# Patient Record
Sex: MALE | Race: WHITE | NOT HISPANIC OR LATINO | Employment: FULL TIME | URBAN - METROPOLITAN AREA
[De-identification: names, ages, dates, MRNs, and addresses within clinical notes are randomized per-mention and may not be internally consistent; named-entity substitution may affect disease eponyms.]

---

## 2017-02-16 ENCOUNTER — ALLSCRIPTS OFFICE VISIT (OUTPATIENT)
Dept: OTHER | Facility: OTHER | Age: 52
End: 2017-02-16

## 2017-02-16 ENCOUNTER — GENERIC CONVERSION - ENCOUNTER (OUTPATIENT)
Dept: OTHER | Facility: OTHER | Age: 52
End: 2017-02-16

## 2017-02-16 DIAGNOSIS — F41.9 ANXIETY DISORDER: ICD-10-CM

## 2017-02-16 DIAGNOSIS — Z12.5 ENCOUNTER FOR SCREENING FOR MALIGNANT NEOPLASM OF PROSTATE: ICD-10-CM

## 2017-03-01 ENCOUNTER — APPOINTMENT (OUTPATIENT)
Dept: LAB | Facility: MEDICAL CENTER | Age: 52
End: 2017-03-01
Payer: COMMERCIAL

## 2017-03-01 ENCOUNTER — TRANSCRIBE ORDERS (OUTPATIENT)
Dept: ADMINISTRATIVE | Facility: HOSPITAL | Age: 52
End: 2017-03-01

## 2017-03-01 DIAGNOSIS — Z12.5 ENCOUNTER FOR SCREENING FOR MALIGNANT NEOPLASM OF PROSTATE: ICD-10-CM

## 2017-03-01 DIAGNOSIS — F41.9 ANXIETY DISORDER: ICD-10-CM

## 2017-03-01 LAB
ALBUMIN SERPL BCP-MCNC: 3.9 G/DL (ref 3.5–5)
ALP SERPL-CCNC: 51 U/L (ref 46–116)
ALT SERPL W P-5'-P-CCNC: 40 U/L (ref 12–78)
ANION GAP SERPL CALCULATED.3IONS-SCNC: 4 MMOL/L (ref 4–13)
AST SERPL W P-5'-P-CCNC: 22 U/L (ref 5–45)
BASOPHILS # BLD AUTO: 0.03 THOUSANDS/ΜL (ref 0–0.1)
BASOPHILS NFR BLD AUTO: 0 % (ref 0–1)
BILIRUB SERPL-MCNC: 0.89 MG/DL (ref 0.2–1)
BILIRUB UR QL STRIP: NEGATIVE
BUN SERPL-MCNC: 16 MG/DL (ref 5–25)
CALCIUM SERPL-MCNC: 9.2 MG/DL (ref 8.3–10.1)
CHLORIDE SERPL-SCNC: 104 MMOL/L (ref 100–108)
CHOLEST SERPL-MCNC: 204 MG/DL (ref 50–200)
CLARITY UR: NORMAL
CO2 SERPL-SCNC: 30 MMOL/L (ref 21–32)
COLOR UR: YELLOW
CREAT SERPL-MCNC: 0.91 MG/DL (ref 0.6–1.3)
EOSINOPHIL # BLD AUTO: 0.13 THOUSAND/ΜL (ref 0–0.61)
EOSINOPHIL NFR BLD AUTO: 2 % (ref 0–6)
ERYTHROCYTE [DISTWIDTH] IN BLOOD BY AUTOMATED COUNT: 13.3 % (ref 11.6–15.1)
GFR SERPL CREATININE-BSD FRML MDRD: >60 ML/MIN/1.73SQ M
GLUCOSE SERPL-MCNC: 89 MG/DL (ref 65–140)
GLUCOSE UR STRIP-MCNC: NEGATIVE MG/DL
HCT VFR BLD AUTO: 43.8 % (ref 36.5–49.3)
HDLC SERPL-MCNC: 58 MG/DL (ref 40–60)
HGB BLD-MCNC: 15.2 G/DL (ref 12–17)
HGB UR QL STRIP.AUTO: NEGATIVE
KETONES UR STRIP-MCNC: NEGATIVE MG/DL
LDLC SERPL CALC-MCNC: 109 MG/DL (ref 0–100)
LEUKOCYTE ESTERASE UR QL STRIP: NEGATIVE
LYMPHOCYTES # BLD AUTO: 1.33 THOUSANDS/ΜL (ref 0.6–4.47)
LYMPHOCYTES NFR BLD AUTO: 19 % (ref 14–44)
MCH RBC QN AUTO: 30.5 PG (ref 26.8–34.3)
MCHC RBC AUTO-ENTMCNC: 34.7 G/DL (ref 31.4–37.4)
MCV RBC AUTO: 88 FL (ref 82–98)
MONOCYTES # BLD AUTO: 0.99 THOUSAND/ΜL (ref 0.17–1.22)
MONOCYTES NFR BLD AUTO: 14 % (ref 4–12)
NEUTROPHILS # BLD AUTO: 4.43 THOUSANDS/ΜL (ref 1.85–7.62)
NEUTS SEG NFR BLD AUTO: 65 % (ref 43–75)
NITRITE UR QL STRIP: NEGATIVE
NRBC BLD AUTO-RTO: 0 /100 WBCS
PH UR STRIP.AUTO: 6.5 [PH] (ref 4.5–8)
PLATELET # BLD AUTO: 246 THOUSANDS/UL (ref 149–390)
PMV BLD AUTO: 10.2 FL (ref 8.9–12.7)
POTASSIUM SERPL-SCNC: 5 MMOL/L (ref 3.5–5.3)
PROT SERPL-MCNC: 7.3 G/DL (ref 6.4–8.2)
PROT UR STRIP-MCNC: NEGATIVE MG/DL
PSA SERPL-MCNC: 1.5 NG/ML (ref 0–4)
RBC # BLD AUTO: 4.99 MILLION/UL (ref 3.88–5.62)
SODIUM SERPL-SCNC: 138 MMOL/L (ref 136–145)
SP GR UR STRIP.AUTO: 1.02 (ref 1–1.03)
TRIGL SERPL-MCNC: 183 MG/DL
UROBILINOGEN UR QL STRIP.AUTO: 0.2 E.U./DL
WBC # BLD AUTO: 6.95 THOUSAND/UL (ref 4.31–10.16)

## 2017-03-01 PROCEDURE — 36415 COLL VENOUS BLD VENIPUNCTURE: CPT

## 2017-03-01 PROCEDURE — 80061 LIPID PANEL: CPT

## 2017-03-01 PROCEDURE — 80053 COMPREHEN METABOLIC PANEL: CPT

## 2017-03-01 PROCEDURE — 81003 URINALYSIS AUTO W/O SCOPE: CPT

## 2017-03-01 PROCEDURE — G0103 PSA SCREENING: HCPCS

## 2017-03-01 PROCEDURE — 85025 COMPLETE CBC W/AUTO DIFF WBC: CPT

## 2017-10-18 ENCOUNTER — GENERIC CONVERSION - ENCOUNTER (OUTPATIENT)
Dept: OTHER | Facility: OTHER | Age: 52
End: 2017-10-18

## 2018-01-12 VITALS
BODY MASS INDEX: 29.04 KG/M2 | OXYGEN SATURATION: 95 % | DIASTOLIC BLOOD PRESSURE: 76 MMHG | HEART RATE: 74 BPM | HEIGHT: 73 IN | WEIGHT: 219.13 LBS | SYSTOLIC BLOOD PRESSURE: 128 MMHG

## 2018-06-10 ENCOUNTER — ANESTHESIA EVENT (OUTPATIENT)
Dept: GASTROENTEROLOGY | Facility: HOSPITAL | Age: 53
End: 2018-06-10
Payer: COMMERCIAL

## 2018-06-11 ENCOUNTER — ANESTHESIA (OUTPATIENT)
Dept: GASTROENTEROLOGY | Facility: HOSPITAL | Age: 53
End: 2018-06-11
Payer: COMMERCIAL

## 2018-06-11 ENCOUNTER — HOSPITAL ENCOUNTER (OUTPATIENT)
Facility: HOSPITAL | Age: 53
Setting detail: OUTPATIENT SURGERY
Discharge: HOME | End: 2018-06-11
Attending: COLON & RECTAL SURGERY | Admitting: COLON & RECTAL SURGERY
Payer: COMMERCIAL

## 2018-06-11 VITALS
HEIGHT: 73 IN | DIASTOLIC BLOOD PRESSURE: 60 MMHG | TEMPERATURE: 98.3 F | SYSTOLIC BLOOD PRESSURE: 113 MMHG | WEIGHT: 205 LBS | RESPIRATION RATE: 16 BRPM | BODY MASS INDEX: 27.17 KG/M2 | OXYGEN SATURATION: 99 % | HEART RATE: 63 BPM

## 2018-06-11 RX ORDER — PROPOFOL 10 MG/ML
INJECTION, EMULSION INTRAVENOUS AS NEEDED
Status: DISCONTINUED | OUTPATIENT
Start: 2018-06-11 | End: 2018-06-11 | Stop reason: SURG

## 2018-06-11 RX ORDER — SODIUM CHLORIDE 9 MG/ML
125 INJECTION, SOLUTION INTRAVENOUS CONTINUOUS
Status: DISCONTINUED | OUTPATIENT
Start: 2018-06-11 | End: 2018-06-11 | Stop reason: HOSPADM

## 2018-06-11 RX ADMIN — LIDOCAINE HYDROCHLORIDE 60 MG: 20 INJECTION, SOLUTION INTRAVENOUS at 09:00

## 2018-06-11 RX ADMIN — PROPOFOL 100 MG: 10 INJECTION, EMULSION INTRAVENOUS at 09:00

## 2018-06-11 RX ADMIN — PROPOFOL 50 MG: 10 INJECTION, EMULSION INTRAVENOUS at 09:10

## 2018-06-11 RX ADMIN — PROPOFOL 25 MG: 10 INJECTION, EMULSION INTRAVENOUS at 09:13

## 2018-06-11 RX ADMIN — PROPOFOL 50 MG: 10 INJECTION, EMULSION INTRAVENOUS at 09:03

## 2018-06-11 RX ADMIN — SODIUM CHLORIDE 125 ML/HR: 0.9 INJECTION, SOLUTION INTRAVENOUS at 08:35

## 2018-06-11 RX ADMIN — PROPOFOL 50 MG: 10 INJECTION, EMULSION INTRAVENOUS at 09:06

## 2018-06-11 NOTE — H&P
[facility]CTAL [facility]  HISTORY AND PHYSICAL EXAM  Rachael Aguilera III II 46 y o  male MRN: [id number]  Unit/Bed#: [facility] [place] Encounter: [id number]    Assessment/Plan     Indication for colonoscopy: Previous history of adenomatous polyps    Plan:  Flexible Colonoscopy    Review of Systems    Historical Information   Past Medical History:   Diagnosis Date    Anxiety 10/16/2012    History of colon polyps      Past Surgical History:   Procedure Laterality Date    COLONOSCOPY  2015    HAND SURGERY      POLYPECTOMY       Social History   History   Alcohol Use    Yes     Comment: socially     History   Drug Use No     History   Smoking Status    Never Smoker   Smokeless Tobacco    Never Used     Family History: non-contributory    Meds/Allergies   all medications and allergies reviewed  Allergies   Allergen Reactions    Penicillins Hives    Procaine GI Intolerance       Objective   First Vitals:   Blood Pressure: 125/78 (06/11/18 0816)  Pulse: 66 (06/11/18 0816)  Temperature: 98 3 °F (36 8 °C) (06/11/18 0816)  Temp Source: Temporal (06/11/18 0816)  Respirations: 18 (06/11/18 0816)  Height: 6' 1" (185 4 cm) (06/11/18 0816)  Weight - Scale: 93 kg (205 lb) (06/11/18 0816)  SpO2: 96 % (06/11/18 0816)    Current Vitals:   Blood Pressure: 125/78 (06/11/18 0816)  Pulse: 66 (06/11/18 0816)  Temperature: 98 3 °F (36 8 °C) (06/11/18 0816)  Temp Source: Temporal (06/11/18 0816)  Respirations: 18 (06/11/18 0816)  Height: 6' 1" (185 4 cm) (06/11/18 0816)  Weight - Scale: 93 kg (205 lb) (06/11/18 0816)  SpO2: 96 % (06/11/18 0816)    No intake or output data in the 24 hours ending 06/11/18 0900    Invasive Devices     Peripheral Intravenous Line            Peripheral IV 06/11/18 Right Arm less than 1 day                Physical Exam    HEENT: Normocephalic, atraumatic  Lungs: Clear  Heart: Regular rate and rhythm  Abdomen: Soft  Nondistended   Nontender  Extremities: No edema    Lab Results: I have personally reviewed pertinent lab results  EKG, Pathology, and Other Studies: I have personally reviewed pertinent reports

## 2018-06-11 NOTE — OP NOTE
OPERATIVE REPORT  PATIENT NAME: Charmaine Herrera III II    :  1965  MRN: [id number]  Pt Location: [facility]    SURGERY DATE: 2018    Indications:  Previous history of colon polyps  Procedure:  Colonoscopy to cecum  Findings:  Normal colonoscopy  Anesthesia Type: IV Sedation with Anesthesia    Complications: None      Procedure: The patient was in the left lateral position  Sedation was administered by anesthesia  Rectal exam was unremarkable  The scope was introduced and passed without difficulty to the cecum  The scope was removed  The bowel preparation was good  There were no mucosal lesions seen  Impression:  Normal colonoscopy  Personal history of colon polyps  Plan:  Repeat colonoscopy 5 years  Specimen(s):  * No specimens in log *      Attestation: I was present for the entire procedure        Patient Disposition: PACU      SIGNATURE: Gabino El MD  DATE: 2018  TIME: 9:18 AM

## 2018-06-11 NOTE — ANESTHESIA POSTPROCEDURE EVALUATION
Post-Op Assessment Note      CV Status:  Stable    Mental Status:  Alert and awake    Hydration Status:  Euvolemic    PONV Controlled:  Controlled    Airway Patency:  Patent    Post Op Vitals Reviewed: Yes          Staff: AnesthesiologistFIFI           /60 (06/11/18 0935)    Temp      Pulse 63 (06/11/18 0935)   Resp 16 (06/11/18 0935)    SpO2 99 % (06/11/18 0935)

## 2018-06-11 NOTE — ANESTHESIA PREPROCEDURE EVALUATION
Review of Systems/Medical History  Patient summary reviewed  Chart reviewed  No history of anesthetic complications     Cardiovascular   Pulmonary  Negative pulmonary ROS        GI/Hepatic    Bowel prep  Comment: hcp          Endo/Other  Negative endo/other ROS      GYN       Hematology  Negative hematology ROS      Musculoskeletal  Negative musculoskeletal ROS        Neurology  Negative neurology ROS      Psychology   Anxiety,              Physical Exam    Airway    Mallampati score: II  TM Distance: >3 FB  Neck ROM: full     Dental   No notable dental hx     Cardiovascular  Rhythm: regular, Rate: normal, Cardiovascular exam normal    Pulmonary  Pulmonary exam normal Breath sounds clear to auscultation,     Other Findings        Anesthesia Plan  ASA Score- 2     Anesthesia Type- IV sedation with anesthesia with ASA Monitors  Additional Monitors:   Airway Plan:         Plan Factors- Patient instructed to abstain from smoking on day of procedure  Patient smoked on day of surgery  Induction- intravenous  Postoperative Plan-     Informed Consent- Anesthetic plan and risks discussed with patient and spouse  I personally reviewed this patient with the CRNA  Discussed and agreed on the Anesthesia Plan with the CRNA  Jeanne Magallanes

## 2018-06-11 NOTE — DISCHARGE INSTRUCTIONS
[facility]  OF [facility] [address] [facility], [address] [place] [address] [place]  Phone: [phone]    DISCHARGE INSTRUCTIONS:    1   __x_ Complete Exam - Normal    2   ___ Exam normal, but entire colon not seen  We will discuss this with you  3   ___ Polyp(s) removed by "burning" - NO pathology report will follow    4   ___ Polyp(s) removed by excision  Pathology report will be available in 4-5 days   Someone from our office will call you with results  5   ___ Exam prompted biopsies  Pathology report will be available in 4-5 days   Someone from our office will call you with results  6   ___ Exam demonstrated findings that need treatment  Prescriptions will be   Given to you  Return to our office in ____ weeks  Please call for appt  7   ___ Original office visit or colonoscopy findings necessitate an office visit  Please call to set up a new appointment    8   ___ Medication  __________________________________________        [address]:    - Go straight home and rest today    - No driving or drinking alcohol for 24 hours    - Resume regular diet and medications unless otherwise instructed  Coumadin and Plavix are blood thinners  You can resume these medications on __________      IF YOU ARE HAVING ANY FEVER, BLEEDING OR PERSISTENT PAIN IN THE ABDOMEN, PLEASE CALL OUR OFFICE ANY DAY OR TIME  [phone]      Ariadna Fernandes MD Physician Signed Colorectal  Op Note Date of Service: 2018  9:01 AM   Case ID: [id number] Case Time: 2018  9:01 AM Surgeon: Ariadna Fernandse MD   Procedure: COLONOSCOPY Location: [facility]          []Hide copied text  []Hover for attribution information  OPERATIVE REPORT  PATIENT NAME: Jennifer Farley    :  1965  MRN: [id number]  Pt Location: [facility]     SURGERY DATE: 2018     Indications:  Previous history of colon polyps      Procedure:  Colonoscopy to cecum      Findings:  Normal colonoscopy      Anesthesia Type: IV Sedation with Anesthesia     Complications: None        Procedure: The patient was in the left lateral position  Sedation was administered by anesthesia  Rectal exam was unremarkable  The scope was introduced and passed without difficulty to the cecum  The scope was removed  The bowel preparation was good  There were no mucosal lesions seen         Impression:  Normal colonoscopy  Personal history of colon polyps      Plan:  Repeat colonoscopy 5 years         Specimen(s):  * No specimens in log *        Attestation: I was present for the entire procedure         Patient Disposition: PACU        SIGNATURE: Kaden Zamorano MD  DATE: June 11, 2018  TIME: 9:18 AM            Routing History              Colonoscopy   WHAT YOU NEED TO KNOW:   A colonoscopy is a procedure to examine the inside of your colon (intestine) with a scope  Polyps or tissue growths may have been removed during your colonoscopy  It is normal to feel bloated and to have some abdominal discomfort  You should be passing gas  If you have hemorrhoids or you had polyps removed, you may have a small amount of bleeding  DISCHARGE INSTRUCTIONS:   Seek care immediately if:   · You have a large amount of bright red blood in your bowel movements  · Your abdomen is hard and firm and you have severe pain  · You have sudden trouble breathing  Contact your healthcare provider if:   · You develop a rash or hives  · You have a fever within 24 hours of your procedure  · You have not had a bowel movement for 3 days after your procedure  · You have questions or concerns about your condition or care  Activity:   · Do not lift, strain, or run  for 3 days after your procedure  · Rest after your procedure  You have been given medicine to relax you  Do not  drive or make important decisions until the day after your procedure  Return to your normal activity as directed      · Relieve gas and discomfort from bloating  by lying on your right side with a heating pad on your abdomen  You may need to take short walks to help the gas move out  Eat small meals until bloating is relieved  If you had polyps removed: For 7 days after your procedure:  · Do not  take aspirin  · Do not  go on long car rides  Help prevent constipation:   · Eat a variety of healthy foods  Healthy foods include fruit, vegetables, whole-grain breads, low-fat dairy products, beans, lean meat, and fish  Ask if you need to be on a special diet  Your healthcare provider may recommend that you eat high-fiber foods such as cooked beans  Fiber helps you have regular bowel movements  · Drink liquids as directed  Adults should drink between 9 and 13 eight-ounce cups of liquid every day  Ask what amount is best for you  For most people, good liquids to drink are water, juice, and milk  · Exercise as directed  Talk to your healthcare provider about the best exercise plan for you  Exercise can help prevent constipation, decrease your blood pressure and improve your health  Follow up with your healthcare provider as directed:  Write down your questions so you remember to ask them during your visits  © 2017 [employer]  Information is for End User's use only and may not be sold, redistributed or otherwise used for commercial purposes  All illustrations and images included in CareNotes® are the copyrighted property of [employer] , [employer]  or [employer]  The above information is an  only  It is not intended as medical advice for individual conditions or treatments  Talk to your doctor, nurse or pharmacist before following any medical regimen to see if it is safe and effective for you

## 2018-12-17 ENCOUNTER — OFFICE VISIT (OUTPATIENT)
Dept: INTERNAL MEDICINE CLINIC | Facility: CLINIC | Age: 53
End: 2018-12-17
Payer: COMMERCIAL

## 2018-12-17 VITALS
WEIGHT: 223.6 LBS | TEMPERATURE: 97.4 F | SYSTOLIC BLOOD PRESSURE: 123 MMHG | HEART RATE: 82 BPM | BODY MASS INDEX: 29.63 KG/M2 | DIASTOLIC BLOOD PRESSURE: 71 MMHG | HEIGHT: 73 IN | OXYGEN SATURATION: 97 %

## 2018-12-17 DIAGNOSIS — Z00.00 WELLNESS EXAMINATION: Primary | ICD-10-CM

## 2018-12-17 DIAGNOSIS — Z12.5 SCREENING PSA (PROSTATE SPECIFIC ANTIGEN): ICD-10-CM

## 2018-12-17 PROCEDURE — 99396 PREV VISIT EST AGE 40-64: CPT | Performed by: INTERNAL MEDICINE

## 2018-12-17 NOTE — PROGRESS NOTES
Assessment/Plan   Problem List Items Addressed This Visit     None      Visit Diagnoses     Wellness examination    -  Primary    Relevant Orders    Comprehensive metabolic panel    CBC and differential    Lipid panel    UA w Reflex to Microscopic w Reflex to Culture    Screening PSA (prostate specific antigen)        Relevant Orders    PSA, Total Screen      Sara Eubanks is doing well overall and will have his annual lab work in the near future  He was encouraged to follow through on his plans to start running regularly again after the holidays  We discussed follow-up visits which should be annually  He was encouraged to call between visits for any questions or problems  Subjective   Patient ID: Mark Mauricio is a 46 y o  male, here for his annual preventive exam and was last seen February 16, 2017 and has been doing well since that time  Vitals:    12/17/18 0756   BP: 123/71   Pulse: 82   Temp: (!) 97 4 °F (36 3 °C)   SpO2: 97%     HPI   He and his family are doing well and his daughter lives in the area after being transferred out of the area  He exercises regularly overall but not this fall as he has been busy with work  He enjoys running and ran a half marathon in spring of this year and did very well  He has excellent exercise tolerance with no chest pain or shortness of breath and no history of heart disease  Other than an episode of pink eye recently he has not had receive medical care  He went to a local pharmacy and saw a PA and pink eye resolved  He is up-to-date on eye care with recent exam and stable vision, up-to-date on dental care  He sees Dermatology every other year with no history of skin cancer, family history unknown as he is adopted  He did have a skin exam today which was benign  He does use sunscreen  He has some dry skin and I suggested using a cold water humidifiers room at night as this only happens in the winter with low humidity is      At age 27 he was diagnosed as having it dysplastic colon polyp, and has had regular screening since that time  His last colonoscopy was April 13, 2015, and this result was negative and he is on a 5 year recall list and has a negative GI review of systems  He did have a prostate exam today was benign and has no prostate symptoms  He declines a flu shot  We discussed follow-up which should be in 1 year for general preventive examination and sooner as needed  The following portions of the patient's history were reviewed and updated as appropriate: allergies, current medications, past family history, past medical history, past social history, past surgical history and problem list     Review of Systems as above, all others negative  Objective   Physical Exam   Vital signs stable with regular pulse  In no acute distress  HEENT exam is normal other than for male pattern baldness  Skin throughout shows no rash or skin malignancy, and some nonspecific dried is of the skin of the back, benign hemangiomas and nevi  Vision hearing grossly normal   Affect normal cognitive status is normal   There is no head or neck adenopathy or supraclavicular adenopathy  Neck supple without JVD, thyroid exam normal on inspection and palpation  Carotid upstroke and descent are normal without bruit  The lungs are clear throughout  Cardiac:  Regular rate rhythm, normal S1 and S2, no murmur, no S4 or S3, PMI fifth intercostal space midclavicular line  Abdomen:  No hernia, no distension, no periumbilical adenopathy or surgical scars, normal bowel sounds, soft and nontender without masses bruits organomegaly present  Rectal exam:  Small external hemorrhoids, normal sphincter tone without masses or tenderness of the rectal vault, brown heme-negative stool, prostate 0 to 1 in size without mass or tenderness  Extremities show no clubbing cyanosis or edema  Peripheral circulation is symmetric normal   No phlebitic findings or calf tenderness are noted  There is no significant joint deformity or any loss of joint function  Attending Attestation (For Attendings USE Only)...

## 2021-06-25 ENCOUNTER — TELEPHONE (OUTPATIENT)
Dept: INTERNAL MEDICINE CLINIC | Facility: CLINIC | Age: 56
End: 2021-06-25

## 2021-06-25 ENCOUNTER — RA CDI HCC (OUTPATIENT)
Dept: OTHER | Facility: HOSPITAL | Age: 56
End: 2021-06-25

## 2021-06-25 DIAGNOSIS — Z13.1 SCREENING FOR DIABETES MELLITUS: ICD-10-CM

## 2021-06-25 DIAGNOSIS — Z00.00 WELL ADULT EXAM: Primary | ICD-10-CM

## 2021-06-25 DIAGNOSIS — Z13.0 SCREENING FOR DEFICIENCY ANEMIA: ICD-10-CM

## 2021-06-25 DIAGNOSIS — Z12.5 SCREENING FOR PROSTATE CANCER: ICD-10-CM

## 2021-06-25 DIAGNOSIS — Z13.220 SCREENING FOR HYPERLIPIDEMIA: ICD-10-CM

## 2021-06-25 NOTE — PROGRESS NOTES
[facility]          Chart reviewed, no opportunity found: CHART REVIEWED, NO OPPORTUNITY FOUND                     Patients insurance company:  [employer] [facility] (Medicare Advantage and Commercial)

## 2021-06-25 NOTE — TELEPHONE ENCOUNTER
Patient scheduled an appointment next Friday and asked if he could have blood work prior to his appt  When/if ordered will call patient to notify

## 2021-06-29 ENCOUNTER — APPOINTMENT (OUTPATIENT)
Dept: LAB | Facility: MEDICAL CENTER | Age: 56
End: 2021-06-29
Payer: COMMERCIAL

## 2021-06-29 DIAGNOSIS — Z00.00 WELL ADULT EXAM: ICD-10-CM

## 2021-06-29 DIAGNOSIS — Z13.220 SCREENING FOR HYPERLIPIDEMIA: ICD-10-CM

## 2021-06-29 LAB
ALBUMIN SERPL BCP-MCNC: 3.7 G/DL (ref 3.5–5)
ALP SERPL-CCNC: 53 U/L (ref 46–116)
ALT SERPL W P-5'-P-CCNC: 38 U/L (ref 12–78)
ANION GAP SERPL CALCULATED.3IONS-SCNC: 4 MMOL/L (ref 4–13)
AST SERPL W P-5'-P-CCNC: 25 U/L (ref 5–45)
BACTERIA UR QL AUTO: NORMAL /HPF
BASOPHILS # BLD AUTO: 0.05 THOUSANDS/ΜL (ref 0–0.1)
BASOPHILS NFR BLD AUTO: 1 % (ref 0–1)
BILIRUB SERPL-MCNC: 0.74 MG/DL (ref 0.2–1)
BILIRUB UR QL STRIP: NEGATIVE
BUN SERPL-MCNC: 13 MG/DL (ref 5–25)
CALCIUM SERPL-MCNC: 9 MG/DL (ref 8.3–10.1)
CHLORIDE SERPL-SCNC: 106 MMOL/L (ref 100–108)
CHOLEST SERPL-MCNC: 195 MG/DL (ref 50–200)
CLARITY UR: CLEAR
CO2 SERPL-SCNC: 27 MMOL/L (ref 21–32)
COLOR UR: YELLOW
CREAT SERPL-MCNC: 0.9 MG/DL (ref 0.6–1.3)
EOSINOPHIL # BLD AUTO: 0.14 THOUSAND/ΜL (ref 0–0.61)
EOSINOPHIL NFR BLD AUTO: 3 % (ref 0–6)
ERYTHROCYTE [DISTWIDTH] IN BLOOD BY AUTOMATED COUNT: 12.9 % (ref 11.6–15.1)
GFR SERPL CREATININE-BSD FRML MDRD: 94 ML/MIN/1.73SQ M
GLUCOSE P FAST SERPL-MCNC: 89 MG/DL (ref 65–99)
GLUCOSE UR STRIP-MCNC: NEGATIVE MG/DL
HCT VFR BLD AUTO: 42.3 % (ref 36.5–49.3)
HDLC SERPL-MCNC: 48 MG/DL
HGB BLD-MCNC: 14.1 G/DL (ref 12–17)
HGB UR QL STRIP.AUTO: NEGATIVE
HYALINE CASTS #/AREA URNS LPF: NORMAL /LPF
IMM GRANULOCYTES # BLD AUTO: 0.03 THOUSAND/UL (ref 0–0.2)
IMM GRANULOCYTES NFR BLD AUTO: 1 % (ref 0–2)
KETONES UR STRIP-MCNC: NEGATIVE MG/DL
LDLC SERPL CALC-MCNC: 125 MG/DL (ref 0–100)
LEUKOCYTE ESTERASE UR QL STRIP: NEGATIVE
LYMPHOCYTES # BLD AUTO: 1.21 THOUSANDS/ΜL (ref 0.6–4.47)
LYMPHOCYTES NFR BLD AUTO: 23 % (ref 14–44)
MCH RBC QN AUTO: 29.7 PG (ref 26.8–34.3)
MCHC RBC AUTO-ENTMCNC: 33.3 G/DL (ref 31.4–37.4)
MCV RBC AUTO: 89 FL (ref 82–98)
MONOCYTES # BLD AUTO: 0.67 THOUSAND/ΜL (ref 0.17–1.22)
MONOCYTES NFR BLD AUTO: 13 % (ref 4–12)
NEUTROPHILS # BLD AUTO: 3.18 THOUSANDS/ΜL (ref 1.85–7.62)
NEUTS SEG NFR BLD AUTO: 59 % (ref 43–75)
NITRITE UR QL STRIP: NEGATIVE
NON-SQ EPI CELLS URNS QL MICRO: NORMAL /HPF
NONHDLC SERPL-MCNC: 147 MG/DL
NRBC BLD AUTO-RTO: 0 /100 WBCS
PH UR STRIP.AUTO: 7 [PH]
PLATELET # BLD AUTO: 257 THOUSANDS/UL (ref 149–390)
PMV BLD AUTO: 9.7 FL (ref 8.9–12.7)
POTASSIUM SERPL-SCNC: 3.7 MMOL/L (ref 3.5–5.3)
PROT SERPL-MCNC: 7.1 G/DL (ref 6.4–8.2)
PROT UR STRIP-MCNC: NEGATIVE MG/DL
PSA SERPL-MCNC: 1.2 NG/ML (ref 0–4)
RBC # BLD AUTO: 4.74 MILLION/UL (ref 3.88–5.62)
RBC #/AREA URNS AUTO: NORMAL /HPF
SODIUM SERPL-SCNC: 137 MMOL/L (ref 136–145)
SP GR UR STRIP.AUTO: 1 (ref 1–1.03)
TRIGL SERPL-MCNC: 110 MG/DL
UROBILINOGEN UR QL STRIP.AUTO: 0.2 E.U./DL
WBC # BLD AUTO: 5.28 THOUSAND/UL (ref 4.31–10.16)
WBC #/AREA URNS AUTO: NORMAL /HPF

## 2021-06-29 PROCEDURE — G0103 PSA SCREENING: HCPCS | Performed by: INTERNAL MEDICINE

## 2021-06-29 PROCEDURE — 85025 COMPLETE CBC W/AUTO DIFF WBC: CPT | Performed by: INTERNAL MEDICINE

## 2021-06-29 PROCEDURE — 81001 URINALYSIS AUTO W/SCOPE: CPT | Performed by: INTERNAL MEDICINE

## 2021-06-29 PROCEDURE — 80061 LIPID PANEL: CPT

## 2021-06-29 PROCEDURE — 80053 COMPREHEN METABOLIC PANEL: CPT | Performed by: INTERNAL MEDICINE

## 2021-06-29 PROCEDURE — 36415 COLL VENOUS BLD VENIPUNCTURE: CPT | Performed by: INTERNAL MEDICINE

## 2021-07-02 ENCOUNTER — OFFICE VISIT (OUTPATIENT)
Dept: INTERNAL MEDICINE CLINIC | Facility: CLINIC | Age: 56
End: 2021-07-02
Payer: COMMERCIAL

## 2021-07-02 VITALS
HEIGHT: 73 IN | HEART RATE: 81 BPM | SYSTOLIC BLOOD PRESSURE: 126 MMHG | DIASTOLIC BLOOD PRESSURE: 78 MMHG | BODY MASS INDEX: 28.49 KG/M2 | TEMPERATURE: 97.2 F | WEIGHT: 215 LBS | OXYGEN SATURATION: 98 %

## 2021-07-02 DIAGNOSIS — Z86.010 HISTORY OF COLON POLYPS: ICD-10-CM

## 2021-07-02 DIAGNOSIS — Z00.00 WELL ADULT EXAM: Primary | ICD-10-CM

## 2021-07-02 PROBLEM — M17.31 POST-TRAUMATIC OSTEOARTHRITIS OF RIGHT KNEE: Status: ACTIVE | Noted: 2021-07-02

## 2021-07-02 PROBLEM — Z86.0100 HISTORY OF COLON POLYPS: Status: ACTIVE | Noted: 2021-07-02

## 2021-07-02 PROCEDURE — 99396 PREV VISIT EST AGE 40-64: CPT | Performed by: INTERNAL MEDICINE

## 2021-07-02 RX ORDER — ZINC GLUCONATE 50 MG
50 TABLET ORAL DAILY
COMMUNITY
End: 2022-04-22

## 2021-07-02 NOTE — PROGRESS NOTES
Assessment/Plan:    1  Well adult exam     2  History of colon polyps  Ambulatory referral for colonoscopy        A&P Notes:    Plan is follow up in 1 year and was encouraged sooner as needed  We discussed lifestyle  We discussed walking and other low-impact exercise verses running to avoid progression of symptoms of osteoarthritis  Subjective:      Patient ID: Lauren Mueller is a 61 y o  male who is here for an annual preventive examination  I last saw Scott Oh in 2018  He has done well during the COVID interval   He is vaccinated and did not have side effects from the Karli Soliman vaccine  He is working very long hours, the 13-14 hours a day as a  in [employer]  He has not been able to take a vacation  His wife are talking about setting a date for him to retire in the next few years  He seems to be coping with this well  This includes no symptoms of depression or sleep disturbance  He was running a year ago and had acute pain in his right knee and since that time has had pain with running, chronic swelling, some stiffness, no instability or locking  He can walk without symptoms  We discussed 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  We discussed conservative plan  This includes taking Tylenol as needed, and possibly Motrin but to minimize analgesics  He generally likes to avoid analgesics  Applying ice is symptomatic was also recommended  He will monitor his symptoms and follow-up as needed  We discussed preventive care  I did recommend that he see Dermatology once a year  Family history is unknown including regarding skin cancer  He is going to make an appointment through his wife's dermatology group  He has a history of a large benign polyp at age 27, removed at that time, and is on a defined follow-up colonoscopy schedule  His last colonoscopy was in 2015 1 polyp was found  He was due last year    He is asymptomatic from a GI standpoint  He will be contacting his private colorectal group, Dr Edgar Weinberg, to arrange for colonoscopy  Eye care and dental care up-to-date  We reviewed his lab work which is optimal   This is despite suboptimal diet and intermittent exercise  Past Medical History:   Diagnosis Date    Anxiety 10/16/2012    History of colon polyps         History reviewed  No pertinent family history  Social History     Socioeconomic History    Marital status: /Civil Union     Spouse name: Not on file    Number of children: Not on file    Years of education: Not on file    Highest education level: Not on file   Occupational History    Not on file   Tobacco Use    Smoking status: Never Smoker    Smokeless tobacco: Never Used   Substance and Sexual Activity    Alcohol use: Yes     Comment: socially    Drug use: No    Sexual activity: Not on file   Other Topics Concern    Not on file   Social History Narrative    Not on file     Social Determinants of Health     Financial Resource Strain:     Difficulty of Paying Living Expenses:    Food Insecurity:     Worried About Running Out of Food in the Last Year:     [address] in the Last Year:    Transportation Needs:     Lack of Transportation (Medical):  Lack of Transportation (Non-Medical):    Physical Activity:     Days of Exercise per Week:     Minutes of Exercise per Session:    Stress:     Feeling of Stress :    Social Connections:     Frequency of Communication with Friends and Family:     Frequency of Social Gatherings with Friends and Family:     Attends Jew Services:     Active Member of Clubs or Organizations:     Attends Club or Organization Meetings:     Marital Status:    Intimate Partner Violence:     Fear of Current or Ex-Partner:     Emotionally Abused:     Physically Abused:     Sexually Abused:          Allergies   Allergen Reactions    Penicillins Hives    Procaine GI Intolerance        Current Outpatient Medications   Medication Sig Dispense Refill    Multiple Vitamins-Minerals (Centrum Silver 50+Men) TABS Take by mouth      Omega-3 Fatty Acids (FISH OIL PO) Take by mouth      VITAMIN D PO Take by mouth      zinc gluconate 50 mg tablet Take 50 mg by mouth daily       No current facility-administered medications for this visit  Review of Systems  no hearing impairment, no change in vision  Occasional mild allergic upper respiratory congestion  No chest pain or shortness of breath  No change in bowel habits, no indigestion  No difficulties with urination  Also see above  Objective:      /78   Pulse 81   Temp (!) 97 2 °F (36 2 °C)   Ht 6' 1" (1 854 m)   Wt 97 5 kg (215 lb)   SpO2 98%   BMI 28 37 kg/m²      Wt Readings from Last 3 Encounters:   07/02/21 97 5 kg (215 lb)   12/17/18 101 kg (223 lb 9 6 oz)   06/11/18 93 kg (205 lb)     Temp Readings from Last 3 Encounters:   07/02/21 (!) 97 2 °F (36 2 °C)   12/17/18 (!) 97 4 °F (36 3 °C) (Tympanic)   06/11/18 98 3 °F (36 8 °C) (Temporal)     BP Readings from Last 3 Encounters:   07/02/21 126/78   12/17/18 123/71   06/11/18 113/60     Pulse Readings from Last 3 Encounters:   07/02/21 81   12/17/18 82   06/11/18 63       VSS, afebrile, pulse regular    Alert and Oriented in NAD    HEENT: NCAT, Pupils equal, 3 mm, EOEMI, no icterus or pallor, no iritis or conjunctivitis  No head or neck mass or adenopathy  Fundi appeared benign including no opacities, normal vasculature no hemorrhages or exudates  ENT:    Ears:  normal-appearing external auditory canals and tympanic membranes,     Nose: patent nasal passages without polyps or masses, no septal deviation, no nasal deformity,     Oral cavity and throat: normal dentition, no gum disease, normal mucosa, patent airway, no hemorrhages or exudates in the throat    Exam of salivary glands and ducts are normal  No submandibular or submental adenopathy    Neck: supple, no trauma or tenderness  No JVD  Normal carotid upstroke and descent without bruits  Trachea midline without stridor  Thyroid exam normal on inspection and palpation  No spinal tenderness, full range of motion  Lymphatics: no adenopathy throughout    Chest:  No trauma or deformity, no supraclavicular adenopathy or bruit  Chest wall expansion is symmetric and normal, expiratory phase is not prolonged  Heart:  Regular rate and rhythm, normal P9-H0, no V9-C7, no clicks murmurs or rubs  Lungs:  Clear to auscultation and normal to percussion  Back:  No trauma or deformity, no CVA mass or CVA tenderness  Skin:  No rash or skin malignancy  Abdomen:  Nondistended, normal bowel sounds, soft nontender without masses bruits organomegaly  There is no hernia  There are no surgical scars  Extremities:  Arterial circulation is symmetrically normal with no clubbing cyanosis or edema  There are no varicosities, there is no calf tenderness or phlebitic findings  Joints:  Right knee: Mild effusion, no instability, no tenderness, full range of motion  Gait appears normal   There is decreased joint space medially  Affect:  Normal    Neurologic:  Normal and stable gait, and otherwise no focal findings as well  Cognitive: Intact           I have reviewed pertinent labs:  CBC:   Lab Results   Component Value Date    WBC 5 28 06/29/2021    RBC 4 74 06/29/2021    HGB 14 1 06/29/2021    HCT 42 3 06/29/2021    MCV 89 06/29/2021     06/29/2021    MCH 29 7 06/29/2021    MCHC 33 3 06/29/2021    RDW 12 9 06/29/2021    MPV 9 7 06/29/2021    NEUTROABS 3 18 06/29/2021     CMP:   Lab Results   Component Value Date    SODIUM 137 06/29/2021    K 3 7 06/29/2021     06/29/2021    CO2 27 06/29/2021    AGAP 4 06/29/2021    BUN 13 06/29/2021    CREATININE 0 90 06/29/2021    GLUC 89 03/01/2017    GLUF 89 06/29/2021    CALCIUM 9 0 06/29/2021    AST 25 06/29/2021    ALT 38 06/29/2021    ALKPHOS 53 06/29/2021    TP 7 1 06/29/2021    ALB 3 7 06/29/2021    TBILI 0 74 06/29/2021    EGFR 94 06/29/2021     Lipid Profile:   Lab Results   Component Value Date    CHOLESTEROL 195 06/29/2021    HDL 48 06/29/2021    TRIG 110 06/29/2021    LDLCALC 125 (H) 06/29/2021    NONHDLC 147 06/29/2021     Urinalysis   Lab Results   Component Value Date    COLORU Yellow 06/29/2021    CLARITYU Clear 06/29/2021    SPECGRAV 1 003 06/29/2021    PHUR 7 0 06/29/2021    LEUKOCYTESUR Negative 06/29/2021    NITRITE Negative 06/29/2021    PROTEIN UA Negative 06/29/2021    GLUCOSEU Negative 06/29/2021    KETONESU Negative 06/29/2021    UROBILINOGEN 0 2 06/29/2021    BILIRUBINUR Negative 06/29/2021    BLOODU Negative 06/29/2021    RBCUA None Seen 06/29/2021    WBCUA None Seen 06/29/2021    EPIS None Seen 06/29/2021    BACTERIA None Seen 06/29/2021     PSA: 1 2 (6/29/21)

## 2021-08-04 ENCOUNTER — OFFICE VISIT (OUTPATIENT)
Dept: INTERNAL MEDICINE CLINIC | Facility: CLINIC | Age: 56
End: 2021-08-04
Payer: COMMERCIAL

## 2021-08-04 VITALS
SYSTOLIC BLOOD PRESSURE: 132 MMHG | WEIGHT: 213.6 LBS | HEIGHT: 73 IN | HEART RATE: 91 BPM | OXYGEN SATURATION: 98 % | DIASTOLIC BLOOD PRESSURE: 80 MMHG | RESPIRATION RATE: 18 BRPM | BODY MASS INDEX: 28.31 KG/M2

## 2021-08-04 DIAGNOSIS — F41.9 ANXIETY: ICD-10-CM

## 2021-08-04 DIAGNOSIS — F41.0 PANIC DISORDER: ICD-10-CM

## 2021-08-04 DIAGNOSIS — T63.301A SPIDER BITE WOUND, ACCIDENTAL OR UNINTENTIONAL, INITIAL ENCOUNTER: Primary | ICD-10-CM

## 2021-08-04 PROCEDURE — 99214 OFFICE O/P EST MOD 30 MIN: CPT | Performed by: INTERNAL MEDICINE

## 2021-08-04 RX ORDER — CITALOPRAM 10 MG/1
10 TABLET ORAL DAILY
Qty: 30 TABLET | Refills: 5 | Status: SHIPPED | OUTPATIENT
Start: 2021-08-04 | End: 2021-08-20

## 2021-08-04 RX ORDER — BUSPIRONE HYDROCHLORIDE 5 MG/1
TABLET ORAL
Qty: 60 TABLET | Refills: 5 | Status: SHIPPED | OUTPATIENT
Start: 2021-08-04

## 2021-08-04 NOTE — PROGRESS NOTES
INTERNAL MEDICINE OFFICE VISIT       NAME: Bianca Sutherland  AGE: 61 y o  SEX: male       : 1965        MRN: [id number]    DATE: 2021  TIME: 12:46 PM    Assessment and Plan   1  Spider bite wound, accidental or unintentional, initial encounter    2  Anxiety  -     citalopram (CeleXA) 10 mg tablet; Take 1 tablet (10 mg total) by mouth daily  -     busPIRone (BUSPAR) 5 mg tablet; One tablet 3 times per day as needed for anxiety    3  Panic disorder  -     citalopram (CeleXA) 10 mg tablet; Take 1 tablet (10 mg total) by mouth daily  -     busPIRone (BUSPAR) 5 mg tablet; One tablet 3 times per day as needed for anxiety                 Chief Complaint   No chief complaint on file  History of Present Illness   Bianca Sutherland is a 61y o -year-old male who is here today to discuss 2 separate issues  First, he was helping his son move out of a run down apartment in [place] this past weekend and felt fine until the next morning when he woke up with body aches, then noticed mild discomfort in the back of his left calf and noticed a puncture michaelle and some surrounding redness, which has since expanded somewhat and become a bit more painful  In the evening of this 1st day of symptoms, which was 2 days ago, he had chills and sweats which decreased when he took 2 Tylenol tablets  Initially had a headache which resolved by the 2nd day which was yesterday  Overnight 2 nights ago and last night, he woke up with chills and sweats and he did take his temperature last night it was 102°  Body aches have resolved  He continues to take 2 regular strength Tylenol as at a time with relief of his symptoms  His symptoms of chills and sweating occur in the evening and at night but not during the day  He does not have any systemic symptoms of hypersensitivity including no hives, no swelling of the face, no hoarseness of voice, no swelling of the tongue, no wheezing, no lightheadedness or syncope      Second, he has a longstanding history of anxiety related to extreme work stress  Currently he is working very long hours and stressful job as an   He must continue to work to support his family  He is constantly anxious and wakes up a lot at night thinking about all of his work responsibilities  At times he is having panic feelings, with some hyperventilation  There is no chest pain or shortness of breath noted  In the past he had taken venlafaxine but prefers avoiding this medicine because of side effects  We discussed starting daily treatment with citalopram 10 mg daily in the morning with potential side effects precautions and delayed onset of action explained, the possible need for dose titration active follow-up which was arranged for 2-3 weeks from now  For short-term relief of anxiety or panic symptoms, including prevent Ng symptoms if he knows he will be in a stressful situation, or to take at bedtime to prevent nocturnal anxiety waking him up, BuSpar 5 mg 3 times a day as needed for anxiety was prescribed  Again, potential side effects precautions were explained  Review of Systems   Review of Systems as above    Active Problem List     Patient Active Problem List   Diagnosis    History of colon polyps    Post-traumatic osteoarthritis of right knee    Anxiety    Panic disorder       The following portions of the patient's history were reviewed and updated as appropriate: allergies, current medications, past family history, past medical history, past social history, past surgical history, and problem list     Objective     Vitals:    08/04/21 1219   BP: 132/80   Pulse: 91   Resp: 18   SpO2: 98%     Wt Readings from Last 3 Encounters:   08/04/21 96 9 kg (213 lb 9 6 oz)   07/02/21 97 5 kg (215 lb)   12/17/18 101 kg (223 lb 9 6 oz)       Physical Exam     Vital signs stable, appears common in no distress    HEENT exam normal including patent airways with no evidence of tongue swelling or otherwise any angioedema  No hoarseness of voice  Trachea midline without stridor  Skin without urticaria or angioedema  Lungs clear and cardiac exam is normal   Abdominal exam is benign  Exam of the left lower extremity demonstrates a fairly diffuse, mildly indurated and red, slightly tender, slightly increased temperature circular area with a central puncture wound, with the central area notable for some very mild subcutaneous mottling of the skin  The degree of tenderness locally is minimal   There is no calf tenderness noted even though this is over the calf, when the muscle itself was palpated, and there are no phlebitic findings  Circulation is intact  Sensory motor function are well preserved including distally  There is no dependent edema  No orders of the defined types were placed in this encounter  ALLERGIES:  Allergies   Allergen Reactions    Penicillins Hives    Procaine GI Intolerance       Current Medications     Current Outpatient Medications   Medication Sig Dispense Refill    Multiple Vitamins-Minerals (Centrum Silver 50+Men) TABS Take by mouth      Omega-3 Fatty Acids (FISH OIL PO) Take by mouth      VITAMIN D PO Take by mouth      zinc gluconate 50 mg tablet Take 50 mg by mouth daily      busPIRone (BUSPAR) 5 mg tablet One tablet 3 times per day as needed for anxiety 60 tablet 5    citalopram (CeleXA) 10 mg tablet Take 1 tablet (10 mg total) by mouth daily 30 tablet 5     No current facility-administered medications for this visit           Kelly Leonardo MD

## 2021-08-20 ENCOUNTER — OFFICE VISIT (OUTPATIENT)
Dept: INTERNAL MEDICINE CLINIC | Facility: CLINIC | Age: 56
End: 2021-08-20
Payer: COMMERCIAL

## 2021-08-20 VITALS
DIASTOLIC BLOOD PRESSURE: 74 MMHG | WEIGHT: 212 LBS | TEMPERATURE: 96.8 F | HEART RATE: 76 BPM | SYSTOLIC BLOOD PRESSURE: 128 MMHG | OXYGEN SATURATION: 98 % | BODY MASS INDEX: 28.1 KG/M2 | RESPIRATION RATE: 16 BRPM | HEIGHT: 73 IN

## 2021-08-20 DIAGNOSIS — F41.0 PANIC DISORDER: ICD-10-CM

## 2021-08-20 DIAGNOSIS — F41.9 ANXIETY: Primary | ICD-10-CM

## 2021-08-20 PROCEDURE — 99213 OFFICE O/P EST LOW 20 MIN: CPT | Performed by: INTERNAL MEDICINE

## 2021-08-20 RX ORDER — CITALOPRAM 20 MG/1
20 TABLET ORAL DAILY
Qty: 30 TABLET | Refills: 5 | Status: SHIPPED | OUTPATIENT
Start: 2021-08-20 | End: 2021-12-23

## 2021-08-20 NOTE — PROGRESS NOTES
INTERNAL MEDICINE OFFICE VISIT       NAME: Danny Guerrero  AGE: 61 y o  SEX: male       : 1965        MRN: [id number]    DATE: 2021  TIME: 8:03 AM    Assessment and Plan   1  Anxiety    2  Panic disorder       Increase citalopram to 20 mg daily, continue BuSpar as currently prescribed, and Vannessa Celaya well check in with me weekly using my chart  Citalopram dose will be titrated as needed  If not effective, will change to a different medication  BuSpar will be continued and has been effective  We discussed making sure that he takes time away from work and he is doing a better job with this  Chief Complaint     Chief Complaint   Patient presents with    Follow-up       History of Present Illness   Danny Guerrero is a 61y o -year-old male who is here for a follow-up visit from  when treatment was started for severe job stress related anxiety with panic disorder  Celexa 10 mg daily was started and there have been essentially no side effects  He has not noticed any improvement in anxiety from this medicine so far  However, he was also started on BuSpar 5 mg 3 times a day as needed for anxiety and this has helped  This includes taking this at bedtime which helps him rather than being awake and anxious about work  He is also taking more time off on weekends and not spending the weekend working  There are no new problems  Review of Systems   Review of Systems still having some episodes of panic attacks and still has background anxiety, although improved      Active Problem List     Patient Active Problem List   Diagnosis    History of colon polyps    Post-traumatic osteoarthritis of right knee    Anxiety    Panic disorder       The following portions of the patient's history were reviewed and updated as appropriate: allergies, current medications, past family history, past medical history, past social history, past surgical history, and problem list     Objective     Vitals: 08/20/21 0749   BP: 128/74   Pulse: 76   Resp: 16   Temp: (!) 96 8 °F (36 °C)   SpO2: 98%     Wt Readings from Last 3 Encounters:   08/20/21 96 2 kg (212 lb)   08/04/21 96 9 kg (213 lb 9 6 oz)   07/02/21 97 5 kg (215 lb)       Physical Exam   Vital signs stable, appears better rested and less anxious, here for purposes of discussion  ALLERGIES:  Allergies   Allergen Reactions    Penicillins Hives    Procaine GI Intolerance       Current Medications     Current Outpatient Medications   Medication Sig Dispense Refill    busPIRone (BUSPAR) 5 mg tablet One tablet 3 times per day as needed for anxiety 60 tablet 5    citalopram (CeleXA) 10 mg tablet Take 1 tablet (10 mg total) by mouth daily 30 tablet 5    Multiple Vitamins-Minerals (Centrum Silver 50+Men) TABS Take by mouth      Omega-3 Fatty Acids (FISH OIL PO) Take by mouth      VITAMIN D PO Take by mouth      zinc gluconate 50 mg tablet Take 50 mg by mouth daily       No current facility-administered medications for this visit           Jocy Thompson MD

## 2021-08-24 ENCOUNTER — TELEPHONE (OUTPATIENT)
Dept: INTERNAL MEDICINE CLINIC | Facility: CLINIC | Age: 56
End: 2021-08-24

## 2021-08-24 NOTE — TELEPHONE ENCOUNTER
Patients wife called and he received a bill for blood work  The bill was [phone] for psa and [facility] and our billing department said it was coding error  They said the provider would need to change it  Please call wife back at [phone]    Thank you

## 2021-08-30 ENCOUNTER — TELEPHONE (OUTPATIENT)
Dept: INTERNAL MEDICINE CLINIC | Facility: CLINIC | Age: 56
End: 2021-08-30

## 2021-08-30 NOTE — TELEPHONE ENCOUNTER
You saw pt on 8/4 for a spider bite on his left calf  The bite has resolved but he has been getting really bad sciatic pains in that leg  He doesn't know if its related  He wants to know your opinion?

## 2021-09-01 NOTE — TELEPHONE ENCOUNTER
Patients wife called and is questioning about the coding error  Please see below message    Thank you

## 2021-09-08 ENCOUNTER — OFFICE VISIT (OUTPATIENT)
Dept: INTERNAL MEDICINE CLINIC | Facility: CLINIC | Age: 56
End: 2021-09-08
Payer: COMMERCIAL

## 2021-09-08 VITALS
DIASTOLIC BLOOD PRESSURE: 80 MMHG | BODY MASS INDEX: 28.63 KG/M2 | HEART RATE: 90 BPM | SYSTOLIC BLOOD PRESSURE: 128 MMHG | HEIGHT: 73 IN | WEIGHT: 216 LBS | RESPIRATION RATE: 18 BRPM | TEMPERATURE: 96.9 F | OXYGEN SATURATION: 97 %

## 2021-09-08 DIAGNOSIS — G57.11 MERALGIA PARESTHETICA OF RIGHT SIDE: ICD-10-CM

## 2021-09-08 DIAGNOSIS — M54.32 SCIATICA OF LEFT SIDE: Primary | ICD-10-CM

## 2021-09-08 DIAGNOSIS — Z11.4 SCREENING FOR HIV (HUMAN IMMUNODEFICIENCY VIRUS): ICD-10-CM

## 2021-09-08 DIAGNOSIS — Z11.59 NEED FOR HEPATITIS C SCREENING TEST: ICD-10-CM

## 2021-09-08 PROCEDURE — 99213 OFFICE O/P EST LOW 20 MIN: CPT | Performed by: INTERNAL MEDICINE

## 2021-09-08 RX ORDER — PREDNISONE 10 MG/1
TABLET ORAL
Qty: 15 TABLET | Refills: 1 | Status: SHIPPED | OUTPATIENT
Start: 2021-09-08 | End: 2022-03-01

## 2021-09-08 NOTE — PROGRESS NOTES
INTERNAL MEDICINE OFFICE VISIT       NAME: Jane Foy  AGE: 61 y o  SEX: male       : 1965        MRN: [id number]    DATE: 2021  TIME: 9:02 AM    Assessment and Plan   1  Sciatica of left side  -     XR spine lumbar minimum 4 views non injury; Future; Expected date: 2021  -     predniSONE 10 mg tablet; 3 tablets a day for 5 days  -     Ambulatory referral to Physical Therapy; Future    2  Meralgia paresthetica of right side    3  Need for hepatitis C screening test  -     Hepatitis C Antibody ([facility]); Future    4  Screening for HIV (human immunodeficiency virus)  -     HIV 1/2 Antigen/Antibody (4th Generation) w Reflex [facility]; Future         Plan: For left-sided L3-4 distributionsciatica, continue optimal ergonomics when sitting, avoid any heavy physical activity, check x-ray lumbar spine, discontinue naproxen, start prednisone 30 mg daily for 5 days option of additional 5 days depending on response, and referral to physical therapy  Possible SE's of Prednisone explained  Right-sided meralgia paresthetica:  Plan is to try to offload the right side when sleeping at night  Prognosis is good for spontaneous recovery  Prevention/Screening: TdaP was declined, Hep C and HIV screening at his convenience (low risk, population screening)  Chief Complaint     Chief Complaint   Patient presents with    Sciatica       History of Present Illness   Jane Foy is a 61y o -year-old male who has a 2 week history of spontaneously developing spasmodic bilateral low back pain with associated pain radiating into the medial left thigh and medial lower leg with tingling and numbness at times, with pain minimal during the day and much worse at night when he lays down to go to sleep  Laying on his left side makes his pain worse  Lying on the right side will partially relieve his symptoms    He avoids lying flat on his back because of history of snoring on his back without sleep apnea     There is no history of trauma  He has not noticed a rash  He has no fever, chills, night sweats, anorexia or weight loss  There is no previous history of sciatic pain  Gait is normal   He is not noticing any weakness  There is no incontinence of bowel or bladder  He has noticed that from laying on his right side, that he has tingling and numbness in the distribution of the right lateral femoral cutaneous nerve since the onset of his symptoms and repositioning on his right side night  Discussed population screening for HIV and hepatitis-C his convenience  He is due for tetanus booster but would rather wait until there is coverage for this vaccine such as when he was a break in the skin  I suggest calling as needed and pack syncope administered after move any minor trauma that would break the skin  Review of Systems   Review of Systems mild edema of left ankle for 4 days     Active Problem List     Patient Active Problem List   Diagnosis    History of colon polyps    Post-traumatic osteoarthritis of right knee    Anxiety    Panic disorder       The following portions of the patient's history were reviewed and updated as appropriate: allergies, current medications, past family history, past medical history, past social history, past surgical history, and problem list     Objective     Vitals:    09/08/21 0748   BP: 128/80   Pulse: 90   Resp: 18   Temp: (!) 96 9 °F (36 1 °C)   SpO2: 97%     Wt Readings from Last 3 Encounters:   09/08/21 98 kg (216 lb)   08/20/21 96 2 kg (212 lb)   08/04/21 96 9 kg (213 lb 9 6 oz)       Physical Exam   Alert, Oriented, in NAD  Gets up from a chair and onto the exam table without difficulty and without pain  Points to medial thigh and medial lower leg as areas of tingling and numbness  Back: no trauma or deformity, no rash, normal ROM without pain, no spinal tenderness   No pilonidal cyst    Hips and knees: FROM without pain, no swelling or tenderness  Negative straight leg raise bilaterally, intact and symmetric IP, hip and lower ext strength, 5/5, Babinski down-going, no sensory deficit  Circulation intact in LE's, with trace edema of left ankle, no calf tenderness, no phlebitic findings, no cellulitis        Orders Placed This Encounter   Procedures    XR spine lumbar minimum 4 views non injury    Hepatitis C Antibody ([facility])    HIV 1/2 Antigen/Antibody (4th Generation) w Reflex [facility]    Ambulatory referral to Physical Therapy       ALLERGIES:  Allergies   Allergen Reactions    Penicillins Hives    Procaine GI Intolerance       Current Medications     Current Outpatient Medications   Medication Sig Dispense Refill    busPIRone (BUSPAR) 5 mg tablet One tablet 3 times per day as needed for anxiety 60 tablet 5    citalopram (CeleXA) 20 mg tablet Take 1 tablet (20 mg total) by mouth daily 30 tablet 5    Multiple Vitamins-Minerals (Centrum Silver 50+Men) TABS Take by mouth      Omega-3 Fatty Acids (FISH OIL PO) Take by mouth      VITAMIN D PO Take by mouth      zinc gluconate 50 mg tablet Take 50 mg by mouth daily      predniSONE 10 mg tablet 3 tablets a day for 5 days 15 tablet 1     No current facility-administered medications for this visit           Floyd Lambert MD

## 2021-09-09 ENCOUNTER — APPOINTMENT (OUTPATIENT)
Dept: RADIOLOGY | Facility: MEDICAL CENTER | Age: 56
End: 2021-09-09
Payer: COMMERCIAL

## 2021-09-09 DIAGNOSIS — M54.32 SCIATICA OF LEFT SIDE: ICD-10-CM

## 2021-09-09 PROCEDURE — 72110 X-RAY EXAM L-2 SPINE 4/>VWS: CPT

## 2021-09-15 ENCOUNTER — EVALUATION (OUTPATIENT)
Dept: PHYSICAL THERAPY | Facility: MEDICAL CENTER | Age: 56
End: 2021-09-15
Payer: COMMERCIAL

## 2021-09-15 DIAGNOSIS — M54.32 SCIATICA OF LEFT SIDE: Primary | ICD-10-CM

## 2021-09-15 PROCEDURE — 97140 MANUAL THERAPY 1/> REGIONS: CPT | Performed by: PHYSICAL THERAPIST

## 2021-09-15 PROCEDURE — 97161 PT EVAL LOW COMPLEX 20 MIN: CPT | Performed by: PHYSICAL THERAPIST

## 2021-09-15 PROCEDURE — 97110 THERAPEUTIC EXERCISES: CPT | Performed by: PHYSICAL THERAPIST

## 2021-09-15 RX ORDER — NAPROXEN 250 MG/1
250 TABLET ORAL 2 TIMES DAILY WITH MEALS
COMMUNITY
End: 2022-04-01

## 2021-09-15 NOTE — PROGRESS NOTES
PT Evaluation     Today's date: 9/15/2021  Patient name: Laurel Golden  : 1965  MRN: [id number]  Referring provider: Derek Pfeiffer MD  Dx:   Encounter Diagnosis   Name Primary?  Sciatica of left side                   Assessment  Assessment details: Laurel Golden is a 62 y/o male who presents with complaints of acute b/l low back pain  The patients greatest concerns are L LE radiating pain and pain after working all day  Primary movement impairment diagnosis of lumbar hypomobility, resulting in pathoanatomical symptoms of sciatica of left side, which limits his ability to perform functional activities without pain  Pt  will benefit from skilled PT services that includes manual therapy techniques to enhance tissue extensibility, neuromuscular re-education to facilitate motor control, therapeutic exercise to increase functional mobility, and modalities prn to reduce pain and inflammation  Impairments: abnormal or restricted ROM, impaired physical strength, lacks appropriate home exercise program, pain with function, poor posture  and poor body mechanics  Understanding of Dx/Px/POC: good   Prognosis: good    Goals  Short Term Goals: to be achieved by 4 weeks  1) Patient to be independent with basic HEP  2) Decrease pain to 3/10 at its worst   3) [facility] lumbar spine [facility] to [facility] in all deficient planes  4) Increase LE strength by 1/2 MMT grade in all deficient planes  5) Patient to report increased ability to sleep without pain  Long Term Goals: to be achieved by discharge  1) FOTO equal to or greater than 80   2) Patient to be independent with comprehensive HEP  3) Abolish pain for improved quality of life  4) Lumbar spine ROM WNL all planes to improve a/iadls  5) Increase LE strength to 5/5 MMT grade in all planes to improve a/iadls  6) Patient to report no sleep interruption secondary to pain  7) Patient to report return to PLOF      Plan  Patient would benefit from: PT eval  Planned modality interventions: thermotherapy: hydrocollator packs  Planned therapy interventions: joint mobilization, manual therapy, massage, neuromuscular re-education, patient education, postural training, strengthening, stretching, therapeutic exercise, home exercise program, graded exercise, flexibility, functional ROM exercises and abdominal trunk stabilization  Frequency: 2x week  Duration in weeks: 6  Treatment plan discussed with: patient      Subjective Evaluation    History of Present Illness  Mechanism of injury: Patient reports that approximately 3 weeks ago he started feeling pain across his lower back that radiates down L LE  He notes tingling/numbness in the left buttocks region, as well as in the L ant tib region  He also notes recent radiating pain into the R hip flexor region  Patient notes that Naproxen relieved the pain for the first night, but not after that  Prednisone for the last 5 days helped to alleviate pain for the most part and he was able to sleep better  Patient denies changes in bowel/bladder  He has pain when he lays down at night  The pain dissipates throughout the day  Patient is a  and sits for 10-14 hours per day  Pain  Current pain ratin  At best pain ratin  At worst pain ratin  Location: L buttocks and L shin  Quality: radiating      Diagnostic Tests  X-ray: normal  Treatments  Current treatment: physical therapy  Patient Goals  Patient goals for therapy: decreased pain        Objective     Concurrent Complaints  Negative for night pain, disturbed sleep, bladder dysfunction, bowel dysfunction, saddle (S4) numbness, cardiac problem, kidney problem, gallbladder problem, stomach problem, ulcer, appendix problem, spleen problem, pancreas problem, history of cancer, history of trauma and infection    Postural Observations  Seated posture: poor  Standing posture: fair        Tenderness     Lumbar Spine  Tenderness in the facet joint (L4-5 bilaterally)       Left Hip   Tenderness in the PSIS  Neurological Testing     Sensation     Lumbar   Left   Intact: light touch    Right   Intact: light touch    Reflexes   Left   Patellar (L4): trace (1+)  Achilles (S1): trace (1+)    Right   Patellar (L4): normal (2+)  Achilles (S1): normal (2+)    Active Range of Motion     Lumbar   Flexion:  Restriction level: moderate  Extension:  Restriction level: moderate  Left lateral flexion:  Restriction level: minimal  Right lateral flexion:  Restriction level: minimal  Left rotation:  Restriction level: minimal  Right rotation:  Restriction level: minimal    Additional Active Range of Motion Details  No pain c/ AROM  Passive Range of Motion     Additional Passive Range of Motion Details  Pain and hypomobility at L4-5 c/ UPAs bilaterally  Joint Play     Hypomobile: L1, L2, L3, L4, L5 and S1     Pain: L4 and L5   Mechanical Assessment    Cervical      Thoracic      Lumbar    Standing flexion:   Pain location:no change  Standing extension:   Pain location: no change  Lying extension:   Pain location: no change    Strength/Myotome Testing     Lumbar   Left   Heel walk: normal  Toe walk: normal    Right   Heel walk: normal  Toe walk: normal    Left Hip   Planes of Motion   Flexion: 5  Extension: 4-  Abduction: 4-  Adduction: 4  External rotation: 4  Internal rotation: 3-    Right Hip   Planes of Motion   Flexion: 5  Extension: 4  Abduction: 4  Adduction: 4  External rotation: 4  Internal rotation: 3-    Left Knee   Flexion: 5  Extension: 5    Right Knee   Flexion: 5  Extension: 5    Left Ankle/Foot   Dorsiflexion: 5  Plantar flexion: 5  Great toe extension: 5    Right Ankle/Foot   Dorsiflexion: 5  Plantar flexion: 5  Great toe extension: 5    Muscle Activation     Additional Muscle Activation Details  Patient demonstrates poor activation of TA/multifidus force couple  Tests     Lumbar   Negative SIJ compression, sacroiliac distraction and sacral spring        Left   Negative crossed SLR, femoral stretch, passive SLR and slump test      Right   Negative crossed SLR, femoral stretch, passive SLR and slump test      Left Hip   Negative RAMANA, FADIR and long sit  Right Hip   Negative RAMANA, FADIR and long sit       General Comments:    Lower quarter screen   Hips: unremarkable  Knees: unremarkable       Precautions:  N/A    Manuals 9/15            UPAs L4-S1 b/l AZ            Hip flexor release AZ            Piriformis release AZ                         Neuro Re-Ed             TA Isometrics                                                    Ther Ex             LTR 20x5s            SKTC 10x 10s            Piriformis str figure 4 sup 3x30s            Seated hamstring str 3x30s            Standing hip flexor str 3x30s                                                   Ther Activity                                       Gait Training                                       Modalities             JAVI Paula, PT  9/15/2021,10:07 AM

## 2021-09-15 NOTE — RESULT ENCOUNTER NOTE
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

## 2021-09-16 ENCOUNTER — TELEPHONE (OUTPATIENT)
Dept: INTERNAL MEDICINE CLINIC | Facility: CLINIC | Age: 56
End: 2021-09-16

## 2021-09-16 ENCOUNTER — OFFICE VISIT (OUTPATIENT)
Dept: URGENT CARE | Facility: MEDICAL CENTER | Age: 56
End: 2021-09-16
Payer: COMMERCIAL

## 2021-09-16 ENCOUNTER — APPOINTMENT (OUTPATIENT)
Dept: RADIOLOGY | Facility: MEDICAL CENTER | Age: 56
End: 2021-09-16
Payer: COMMERCIAL

## 2021-09-16 VITALS
HEART RATE: 98 BPM | SYSTOLIC BLOOD PRESSURE: 113 MMHG | RESPIRATION RATE: 18 BRPM | TEMPERATURE: 98.3 F | DIASTOLIC BLOOD PRESSURE: 69 MMHG | OXYGEN SATURATION: 96 %

## 2021-09-16 DIAGNOSIS — M54.50 ACUTE LOW BACK PAIN, UNSPECIFIED BACK PAIN LATERALITY, UNSPECIFIED WHETHER SCIATICA PRESENT: ICD-10-CM

## 2021-09-16 DIAGNOSIS — M54.40 ACUTE BILATERAL LOW BACK PAIN WITH SCIATICA, SCIATICA LATERALITY UNSPECIFIED: Primary | ICD-10-CM

## 2021-09-16 DIAGNOSIS — R20.2 PARESTHESIA: ICD-10-CM

## 2021-09-16 LAB
SL AMB  POCT GLUCOSE, UA: ABNORMAL
SL AMB LEUKOCYTE ESTERASE,UA: ABNORMAL
SL AMB POCT BILIRUBIN,UA: ABNORMAL
SL AMB POCT BLOOD,UA: ABNORMAL
SL AMB POCT CLARITY,UA: CLEAR
SL AMB POCT COLOR,UA: ABNORMAL
SL AMB POCT KETONES,UA: ABNORMAL
SL AMB POCT NITRITE,UA: ABNORMAL
SL AMB POCT PH,UA: 6
SL AMB POCT SPECIFIC GRAVITY,UA: 1.02
SL AMB POCT URINE PROTEIN: ABNORMAL
SL AMB POCT UROBILINOGEN: 0.2

## 2021-09-16 PROCEDURE — 81002 URINALYSIS NONAUTO W/O SCOPE: CPT | Performed by: PHYSICIAN ASSISTANT

## 2021-09-16 PROCEDURE — 99213 OFFICE O/P EST LOW 20 MIN: CPT | Performed by: PHYSICIAN ASSISTANT

## 2021-09-16 PROCEDURE — 72100 X-RAY EXAM L-S SPINE 2/3 VWS: CPT

## 2021-09-16 RX ORDER — METHOCARBAMOL 500 MG/1
500 TABLET, FILM COATED ORAL 3 TIMES DAILY
Qty: 30 TABLET | Refills: 0 | Status: SHIPPED | OUTPATIENT
Start: 2021-09-16 | End: 2022-03-01

## 2021-09-16 RX ORDER — KETOROLAC TROMETHAMINE 30 MG/ML
30 INJECTION, SOLUTION INTRAMUSCULAR; INTRAVENOUS ONCE
Status: COMPLETED | OUTPATIENT
Start: 2021-09-16 | End: 2021-09-16

## 2021-09-16 RX ORDER — METHOCARBAMOL 500 MG/1
500 TABLET, FILM COATED ORAL 3 TIMES DAILY
Qty: 30 TABLET | Refills: 0 | Status: SHIPPED | OUTPATIENT
Start: 2021-09-16 | End: 2021-09-16

## 2021-09-16 RX ORDER — LIDOCAINE 50 MG/G
1 PATCH TOPICAL DAILY
Qty: 10 PATCH | Refills: 0 | Status: SHIPPED | OUTPATIENT
Start: 2021-09-16 | End: 2022-03-01

## 2021-09-16 RX ADMIN — KETOROLAC TROMETHAMINE 30 MG: 30 INJECTION, SOLUTION INTRAMUSCULAR; INTRAVENOUS at 16:02

## 2021-09-16 NOTE — TELEPHONE ENCOUNTER
Patient called to report last week you gave him prednisone  He followed the direction on taking it in the AM  However, it wasn't until he was done the treatment that he realized he was supposed to take 30mg in the morning, he was taking 10mg 3x a day on sperate times of the day  But he did state that the prednisone help even though he took it differently  He finished prednisone and yesterday he had an appointment with PT so he took aleve prior to the appt  He states it went well and he felt great but after 2 hours he had severe pain in his pelvic area  He also had some shoulder pain as well which combined is not letting him get much sleep  He states its very painful and almost to the point of him going to an urgent care at night  He said he wanted to discuss this with you prior to possible being evaluated somewhere

## 2021-09-16 NOTE — PATIENT INSTRUCTIONS
Paresthesia   WHAT YOU NEED TO KNOW:   Paresthesia is numbness, tingling, or burning  It can happen in any part of your body, but usually occurs in your legs, feet, arms, or hands  DISCHARGE INSTRUCTIONS:   Return to the emergency department if:   · You have severe pain along with numbness and tingling  · Your legs suddenly become cold  You have trouble moving your legs, and they ache  · You have increased weakness in a part of your body  · You have uncontrolled movements  Contact your healthcare provider or neurologist if:   · Your symptoms do not improve  · You have symptoms in more than one part of your body  · You have questions or concerns about your condition or care  Manage paresthesia:   · Protect the area from injury  You may injure or burn yourself if you lose feeling in the area  Be careful when you touch anything that could be hot  Wear sturdy shoes to protect your feet  Ask about other ways to protect yourself  · Go to physical or occupational therapy if directed  Your provider may recommend therapy if you have a condition such as carpal tunnel syndrome  A physical therapist can teach you exercises to help strengthen the area or increase your ability to move it  An occupational therapist can help you find new ways to do your daily activities  · Manage health conditions that can cause paresthesia  Work with your diabetes specialist if you have uncontrolled diabetes  A dietitian or your healthcare provider can help you create a meal plan if you have low vitamin B levels  Your provider can help you manage your health if you have multiple sclerosis or you had a stroke  It is important to manage health conditions to stop paresthesia or prevent it from getting worse  Follow up with your healthcare provider or neurologist as directed: Your healthcare provider may refer you to a specialist  Write down your questions so you remember to ask them during your visits    © Copyright [employer] 2021 Information is for [employer] use only and may not be sold, redistributed or otherwise used for commercial purposes  All illustrations and images included in CareNotes® are the copyrighted property of A D A M , Inc  or Romain Umanzor  The above information is an  only  It is not intended as medical advice for individual conditions or treatments  Talk to your doctor, nurse or pharmacist before following any medical regimen to see if it is safe and effective for you  Acute Low Back Pain   WHAT YOU NEED TO KNOW:   Acute low back pain is sudden discomfort that lasts up to 6 weeks and makes activity difficult  DISCHARGE INSTRUCTIONS:   Return to the emergency department if:   · You have severe pain  · You have sudden stiffness and heaviness on both buttocks down to both legs  · You have numbness or weakness in one leg, or pain in both legs  · You have numbness in your genital area or across your lower back  · You cannot control your urine or bowel movements  Call your doctor if:   · You have a fever  · You have pain at night or when you rest     · Your pain does not get better with treatment  · You have pain that worsens when you cough or sneeze  · You suddenly feel something pop or snap in your back  · You have questions or concerns about your condition or care  Medicines: You may need any of the following:  · NSAIDs , such as ibuprofen, help decrease swelling, pain, and fever  This medicine is available with or without a doctor's order  NSAIDs can cause stomach bleeding or kidney problems in certain people  If you take blood thinner medicine, always ask your healthcare provider if NSAIDs are safe for you  Always read the medicine label and follow directions  · Acetaminophen  decreases pain and fever  It is available without a doctor's order  Ask how much to take and how often to take it  Follow directions   Read the labels of all other medicines you are using to see if they also contain acetaminophen, or ask your doctor or pharmacist  Acetaminophen can cause liver damage if not taken correctly  Do not use more than 4 grams (4,000 milligrams) total of acetaminophen in one day  · Muscle relaxers  decrease pain by relaxing the muscles in your lower spine  · Prescription pain medicine  may be given  Ask your healthcare provider how to take this medicine safely  Some prescription pain medicines contain acetaminophen  Do not take other medicines that contain acetaminophen without talking to your healthcare provider  Too much acetaminophen may cause liver damage  Prescription pain medicine may cause constipation  Ask your healthcare provider how to prevent or treat constipation  Self-care:   · Stay active  as much as you can without causing more pain  Bed rest could make your back pain worse  Start with some light exercises, such as walking  Avoid heavy lifting until your pain is gone  Ask for more information about the activities or exercises that are right for you  · Apply heat  on your back for 20 to 30 minutes every 2 hours for as many days as directed  Heat helps decrease pain and muscle spasms  Alternate heat and ice  · Apply ice  on your back for 15 to 20 minutes every hour or as directed  Use an ice pack, or put crushed ice in a plastic bag  Cover it with a towel before you apply it to your skin  Ice helps prevent tissue damage and decreases swelling and pain  Prevent acute low back pain:   · Use proper body mechanics  ? Bend at the hips and knees when you  objects  Do not bend from the waist  Use your leg muscles as you lift the load  Do not use your back  Keep the object close to your chest as you lift it  Try not to twist or lift anything above your waist     ? Change your position often when you stand for long periods of time  Rest one foot on a small box or footrest, and then switch to the other foot often      ? Try not to sit for long periods of time  When you do, sit in a straight-backed chair with your feet flat on the floor  Never reach, pull, or push while you are sitting  · Do exercises that strengthen your back muscles  Warm up before you exercise  Ask your healthcare provider the best exercises for you  · Maintain a healthy weight  Ask your healthcare provider what a healthy weight is for you  Ask him or her to help you create a weight loss plan if you are overweight  Follow up with your doctor as directed:  Return for a follow-up visit if you still have pain after 1 to 3 weeks of treatment  You may need to visit an orthopedist if your back pain lasts longer than 12 weeks  Write down your questions so you remember to ask them during your visits  © Copyright [employer] 2021 Information is for End User's use only and may not be sold, redistributed or otherwise used for commercial purposes  All illustrations and images included in CareNotes® are the copyrighted property of A D A M , Inc  or [facility] [place]   The above information is an  only  It is not intended as medical advice for individual conditions or treatments  Talk to your doctor, nurse or pharmacist before following any medical regimen to see if it is safe and effective for you

## 2021-09-16 NOTE — PROGRESS NOTES
[facility]        NAME: Rachael Aguilera is a 61 y o  male  : 1965    MRN: [id number]  DATE: 2021  TIME: 7:44 PM    /69   Pulse 98   Temp 98 3 °F (36 8 °C)   Resp 18   SpO2 96%     Assessment and Plan   Acute bilateral low back pain with sciatica, sciatica laterality unspecified [M54 40]  1  Acute bilateral low back pain with sciatica, sciatica laterality unspecified  XR spine lumbar 2 or 3 views injury    POCT urine dip    ketorolac (TORADOL) injection 30 mg    lidocaine (LIDODERM) 5 %    methocarbamol (ROBAXIN) 500 mg tablet    DISCONTINUED: methocarbamol (ROBAXIN) 500 mg tablet   2  Paresthesia  lidocaine (LIDODERM) 5 %    methocarbamol (ROBAXIN) 500 mg tablet    DISCONTINUED: methocarbamol (ROBAXIN) 500 mg tablet         Patient Instructions       Follow up with PCP in 3-5 days  Proceed to  ER if symptoms worsen  Chief Complaint     Chief Complaint   Patient presents with    Pain     pain to right shoulder and tricep x yesterday  pain to bilateral buttocks and groin that shoots down left leg to his shin  onset about 3-4 weeks ago when he felt a "tear" from the left medial thigh down to his left medial ankle  evaluated by his PCP  tried nsaids and then course of prednisone  attended PT x 1 day; felt good on discharge; however, now he has the the bilateral buttock/groin pain and right shoulder pain/numbness   Numbness     numbness to right tricep/upper arm area x yesterday         History of Present Illness       Pt with low back pain worse after seeing physical therapist yesterday  Pain in back radiating down both legs  Last night severe       Review of Systems   Review of Systems   Constitutional: Negative  HENT: Negative  Eyes: Negative  Respiratory: Negative  Cardiovascular: Negative  Gastrointestinal: Negative  Endocrine: Negative  Genitourinary: Negative  Musculoskeletal: Positive for back pain  Skin: Negative      Allergic/Immunologic: Negative  Neurological: Negative  Hematological: Negative  Psychiatric/Behavioral: Negative  All other systems reviewed and are negative  Current Medications       Current Outpatient Medications:     busPIRone (BUSPAR) 5 mg tablet, One tablet 3 times per day as needed for anxiety, Disp: 60 tablet, Rfl: 5    citalopram (CeleXA) 20 mg tablet, Take 1 tablet (20 mg total) by mouth daily, Disp: 30 tablet, Rfl: 5    Multiple Vitamins-Minerals (Centrum Silver 50+Men) TABS, Take by mouth, Disp: , Rfl:     naproxen (NAPROSYN) 250 mg tablet, Take 250 mg by mouth 2 (two) times a day with meals, Disp: , Rfl:     Omega-3 Fatty Acids (FISH OIL PO), Take by mouth, Disp: , Rfl:     predniSONE 10 mg tablet, 3 tablets a day for 5 days, Disp: 15 tablet, Rfl: 1    VITAMIN D PO, Take by mouth, Disp: , Rfl:     zinc gluconate 50 mg tablet, Take 50 mg by mouth daily, Disp: , Rfl:     lidocaine (LIDODERM) 5 %, Apply 1 patch topically daily Remove & Discard patch within 12 hours or as directed by MD, Disp: 10 patch, Rfl: 0    methocarbamol (ROBAXIN) 500 mg tablet, Take 1 tablet (500 mg total) by mouth 3 (three) times a day for 10 days, Disp: 30 tablet, Rfl: 0  No current facility-administered medications for this visit      Current Allergies     Allergies as of 09/16/2021 - Reviewed 09/16/2021   Allergen Reaction Noted    Penicillins Hives 01/29/2014    Procaine GI Intolerance 01/29/2014            The following portions of the patient's history were reviewed and updated as appropriate: allergies, current medications, past family history, past medical history, past social history, past surgical history and problem list      Past Medical History:   Diagnosis Date    Anxiety 10/16/2012    History of colon polyps        Past Surgical History:   Procedure Laterality Date    COLONOSCOPY  2015    HAND SURGERY      POLYPECTOMY      IN COLONOSCOPY FLX DX W/COLLJ SPEC WHEN PFRMD N/A 6/11/2018    Procedure: COLONOSCOPY;  Surgeon: Jagdeep Menon MD;  Location: [facility]; Service: Colorectal       History reviewed  No pertinent family history  Medications have been verified  Objective   /69   Pulse 98   Temp 98 3 °F (36 8 °C)   Resp 18   SpO2 96%        Physical Exam     Physical Exam  Vitals and nursing note reviewed  Constitutional:       Appearance: Normal appearance  He is normal weight  Comments: Denies any bowel no bladder incontinence issues     Discussed option of going to er for possible ct scan or mri  Pt will go if condition worsens     Pt has prednisone rx from family doctor     Called pharmacy with procaine allergy will hold off on lidoderm patches, discussed same with pt    HENT:      Head: Normocephalic and atraumatic  Right Ear: Tympanic membrane, ear canal and external ear normal       Left Ear: Tympanic membrane, ear canal and external ear normal       Nose: Nose normal       Mouth/Throat:      Mouth: Mucous membranes are moist    Eyes:      Extraocular Movements: Extraocular movements intact  Pupils: Pupils are equal, round, and reactive to light  Cardiovascular:      Rate and Rhythm: Normal rate and regular rhythm  Pulses: Normal pulses  Heart sounds: Normal heart sounds  Pulmonary:      Effort: Pulmonary effort is normal       Breath sounds: Normal breath sounds  Abdominal:      General: Abdomen is flat  Bowel sounds are normal       Palpations: Abdomen is soft  Musculoskeletal:         General: Normal range of motion  Cervical back: Normal range of motion and neck supple        Comments: Lumbar and bilat paraspinal tenderness   dtr left +2 right +3   Great toe ext strong bilat  Left lower leg paresthesias l5s1 derematome right leg parethesias [id number] dermatomes left medial thigh paresthesia  no sciatic notch pain bilat sacroiliac tenderness   dp pulses strong bilat       No pain or burning around into groin and pelvis like last doug    Skin: General: Skin is warm  Capillary Refill: Capillary refill takes less than 2 seconds  Neurological:      General: No focal deficit present  Mental Status: He is alert and oriented to person, place, and time     Psychiatric:         Mood and Affect: Mood normal          Behavior: Behavior normal

## 2021-09-20 ENCOUNTER — OFFICE VISIT (OUTPATIENT)
Dept: PHYSICAL THERAPY | Facility: MEDICAL CENTER | Age: 56
End: 2021-09-20
Payer: COMMERCIAL

## 2021-09-20 DIAGNOSIS — M54.32 SCIATICA OF LEFT SIDE: Primary | ICD-10-CM

## 2021-09-20 PROCEDURE — 97110 THERAPEUTIC EXERCISES: CPT | Performed by: PHYSICAL THERAPIST

## 2021-09-20 PROCEDURE — 97140 MANUAL THERAPY 1/> REGIONS: CPT | Performed by: PHYSICAL THERAPIST

## 2021-09-20 NOTE — PROGRESS NOTES
Daily Note     Today's date: 2021  Patient name: Urszula Hood  : 1965  MRN: [id number]  Referring provider: Chika Bruce MD  Dx:   Encounter Diagnosis     ICD-10-CM    1  Sciatica of left side  M54 32                   Subjective:  Patient states that he felt good after lv for a few hours and then experienced muscle spasms and went to Urgent Care  Objective: See treatment diary below  Assessment:  Patient presents c/ hypomobility t/o L4-S1 region bilaterally  Patient has mild numbness t/o L L4 dermatome  Patient is experiencing tightness in his pelvic region t/o hip flexor and piriformis mm  QL is also tight bilaterally  Decreased tightness and no radiating symptoms post manuals  Tolerated treatment well  Patient would benefit from continued PT  Plan: Continue per plan of care        Precautions:  N/A    Manuals 9/15 9/20           UPAs L4-S1 b/l AZ AZ           Hip flexor release AZ            Piriformis release AZ AZ                        Neuro Re-Ed             TA Isometrics  20x5s                                                  Ther Ex             LTR 20x5s 20x5s           SKTC 10x 10s 10x 10s           Piriformis str figure 4 sup 3x30s 3x30s           Seated hamstring str 3x30s 3x30s           Standing hip flexor str 3x30s 3x30s                                                  Ther Activity                                       Gait Training                                       Modalities               10'

## 2021-09-23 ENCOUNTER — OFFICE VISIT (OUTPATIENT)
Dept: PHYSICAL THERAPY | Facility: MEDICAL CENTER | Age: 56
End: 2021-09-23
Payer: COMMERCIAL

## 2021-09-23 DIAGNOSIS — M54.32 SCIATICA OF LEFT SIDE: Primary | ICD-10-CM

## 2021-09-23 PROCEDURE — 97110 THERAPEUTIC EXERCISES: CPT | Performed by: PHYSICAL THERAPIST

## 2021-09-23 PROCEDURE — 97140 MANUAL THERAPY 1/> REGIONS: CPT | Performed by: PHYSICAL THERAPIST

## 2021-09-23 NOTE — PROGRESS NOTES
Daily Note     Today's date: 2021  Patient name: Florecita Bryan  : 1965  MRN: [id number]  Referring provider: Dwayne Orta MD  Dx:   Encounter Diagnosis     ICD-10-CM    1  Sciatica of left side  M54 32                   Subjective:  Patient states that he is doing well  Objective: See treatment diary below  Assessment:  Patient presents c/ lumbar hypomobility and piriformis/hip flexor tightness bilaterally  He did well c/ exercise progressions without radiating pain post tx  Tolerated treatment well  Patient would benefit from continued PT  Plan: Continue per plan of care        Precautions:  N/A    Manuals 9/15 9/20 9/23          UPAs L4-S1 b/l AZ AZ AZ          Hip flexor release AZ            Piriformis release AZ AZ AZ                       Neuro Re-Ed             TA Isometrics  20x5s 20x5s                                                 Ther Ex             LTR 20x5s 20x5s 20x5s          SKTC 10x 10s 10x 10s 10x 10s          Piriformis str figure 4 sup 3x30s 3x30s 3x30s          Seated hamstring str 3x30s 3x30s 3x30s          Standing hip flexor str 3x30s 3x30s 3x30s          Prone glute squeezes   30x5s          Prone hip ext   2x10                       Ther Activity                                       Gait Training                                       Modalities               10' 10'

## 2021-09-27 ENCOUNTER — OFFICE VISIT (OUTPATIENT)
Dept: PHYSICAL THERAPY | Facility: MEDICAL CENTER | Age: 56
End: 2021-09-27
Payer: COMMERCIAL

## 2021-09-27 DIAGNOSIS — M54.32 SCIATICA OF LEFT SIDE: Primary | ICD-10-CM

## 2021-09-27 PROCEDURE — 97140 MANUAL THERAPY 1/> REGIONS: CPT | Performed by: PHYSICAL THERAPIST

## 2021-09-27 PROCEDURE — 97110 THERAPEUTIC EXERCISES: CPT | Performed by: PHYSICAL THERAPIST

## 2021-09-27 NOTE — PROGRESS NOTES
Daily Note     Today's date: 2021  Patient name: Clara Negro  : 1965  MRN: [id number]  Referring provider: Stevenson Pryor MD  Dx:   Encounter Diagnosis     ICD-10-CM    1  Sciatica of left side  M54 32                   Subjective:  Patient states that he feels good  Objective: See treatment diary below  Assessment:  Patient presents without radiating symptoms  He is doing well c/ exercise progressions  Patient demonstrates increased lumbar mobility post manuals  Tolerated treatment well  Patient would benefit from continued PT  Plan: Continue per plan of care        Precautions:  N/A    Manuals 9/15 9/20 9/23 9/27         UPAs L4-S1 b/l AZ AZ AZ AZ         Hip flexor release AZ            Piriformis release AZ AZ AZ AZ                      Neuro Re-Ed             TA Isometrics  20x5s 20x5s 20x5s                                                Ther Ex             LTR 20x5s 20x5s 20x5s 20x5s         SKTC 10x 10s 10x 10s 10x 10s 10x 10s         Piriformis str figure 4 sup 3x30s 3x30s 3x30s 3x30s         Seated hamstring str 3x30s 3x30s 3x30s 3x30s         Standing hip flexor str 3x30s 3x30s 3x30s 3x30s         Prone glute squeezes   30x5s 30x5s         Prone hip ext   2x10 3x10                      Ther Activity                                       Gait Training                                       Modalities               10' 10' 10'

## 2021-10-06 ENCOUNTER — OFFICE VISIT (OUTPATIENT)
Dept: PHYSICAL THERAPY | Facility: MEDICAL CENTER | Age: 56
End: 2021-10-06
Payer: COMMERCIAL

## 2021-10-06 DIAGNOSIS — M54.32 SCIATICA OF LEFT SIDE: Primary | ICD-10-CM

## 2021-10-06 PROCEDURE — 97110 THERAPEUTIC EXERCISES: CPT | Performed by: PHYSICAL THERAPIST

## 2021-10-06 PROCEDURE — 97140 MANUAL THERAPY 1/> REGIONS: CPT | Performed by: PHYSICAL THERAPIST

## 2021-10-08 ENCOUNTER — OFFICE VISIT (OUTPATIENT)
Dept: PHYSICAL THERAPY | Facility: MEDICAL CENTER | Age: 56
End: 2021-10-08
Payer: COMMERCIAL

## 2021-10-08 DIAGNOSIS — M54.32 SCIATICA OF LEFT SIDE: Primary | ICD-10-CM

## 2021-10-08 PROCEDURE — 97140 MANUAL THERAPY 1/> REGIONS: CPT | Performed by: PHYSICAL THERAPIST

## 2021-10-08 PROCEDURE — 97110 THERAPEUTIC EXERCISES: CPT | Performed by: PHYSICAL THERAPIST

## 2021-10-11 ENCOUNTER — OFFICE VISIT (OUTPATIENT)
Dept: PHYSICAL THERAPY | Facility: MEDICAL CENTER | Age: 56
End: 2021-10-11
Payer: COMMERCIAL

## 2021-10-11 DIAGNOSIS — M54.32 SCIATICA OF LEFT SIDE: Primary | ICD-10-CM

## 2021-10-11 PROCEDURE — 97140 MANUAL THERAPY 1/> REGIONS: CPT | Performed by: PHYSICAL THERAPIST

## 2021-10-11 PROCEDURE — 97110 THERAPEUTIC EXERCISES: CPT | Performed by: PHYSICAL THERAPIST

## 2021-10-13 ENCOUNTER — APPOINTMENT (OUTPATIENT)
Dept: PHYSICAL THERAPY | Facility: MEDICAL CENTER | Age: 56
End: 2021-10-13
Payer: COMMERCIAL

## 2021-10-20 ENCOUNTER — APPOINTMENT (OUTPATIENT)
Dept: PHYSICAL THERAPY | Facility: MEDICAL CENTER | Age: 56
End: 2021-10-20
Payer: COMMERCIAL

## 2021-10-22 ENCOUNTER — APPOINTMENT (OUTPATIENT)
Dept: PHYSICAL THERAPY | Facility: MEDICAL CENTER | Age: 56
End: 2021-10-22
Payer: COMMERCIAL

## 2021-10-25 ENCOUNTER — APPOINTMENT (OUTPATIENT)
Dept: PHYSICAL THERAPY | Facility: MEDICAL CENTER | Age: 56
End: 2021-10-25
Payer: COMMERCIAL

## 2021-10-29 ENCOUNTER — APPOINTMENT (OUTPATIENT)
Dept: PHYSICAL THERAPY | Facility: MEDICAL CENTER | Age: 56
End: 2021-10-29
Payer: COMMERCIAL

## 2021-12-23 DIAGNOSIS — F41.0 PANIC DISORDER: ICD-10-CM

## 2021-12-23 DIAGNOSIS — F41.9 ANXIETY: ICD-10-CM

## 2021-12-23 RX ORDER — CITALOPRAM 20 MG/1
TABLET ORAL
Qty: 90 TABLET | Refills: 1 | Status: SHIPPED | OUTPATIENT
Start: 2021-12-23 | End: 2022-04-01 | Stop reason: SDUPTHER

## 2022-03-01 ENCOUNTER — OFFICE VISIT (OUTPATIENT)
Dept: INTERNAL MEDICINE CLINIC | Facility: CLINIC | Age: 57
End: 2022-03-01
Payer: COMMERCIAL

## 2022-03-01 VITALS
TEMPERATURE: 97.8 F | HEIGHT: 73 IN | SYSTOLIC BLOOD PRESSURE: 148 MMHG | RESPIRATION RATE: 16 BRPM | OXYGEN SATURATION: 99 % | HEART RATE: 90 BPM | WEIGHT: 228 LBS | DIASTOLIC BLOOD PRESSURE: 90 MMHG | BODY MASS INDEX: 30.22 KG/M2

## 2022-03-01 DIAGNOSIS — M25.561 PAIN AND SWELLING OF RIGHT KNEE: Primary | ICD-10-CM

## 2022-03-01 DIAGNOSIS — R03.0 ELEVATED BLOOD PRESSURE READING IN OFFICE WITHOUT DIAGNOSIS OF HYPERTENSION: ICD-10-CM

## 2022-03-01 DIAGNOSIS — M25.461 PAIN AND SWELLING OF RIGHT KNEE: Primary | ICD-10-CM

## 2022-03-01 PROCEDURE — 99214 OFFICE O/P EST MOD 30 MIN: CPT | Performed by: NURSE PRACTITIONER

## 2022-03-01 RX ORDER — METHYLPREDNISOLONE 4 MG/1
TABLET ORAL
Qty: 21 EACH | Refills: 0 | Status: SHIPPED | OUTPATIENT
Start: 2022-03-01 | End: 2022-04-01

## 2022-03-01 NOTE — ASSESSMENT & PLAN NOTE
1 week hx of swelling of R knee with pain, warmth  There appears to be an anterior effusion, normal joint stabiity, non-tender to palpation  ROM is limited by swelling, reduced flexion  Swelling extends mildly through the ankle, pain more localized to the knee itself  Ambulating with limp  Would like pt evaluated by ortho, referral provided  If unable to be seen promptly, will have pt start medrol dose pack and have pt schedule US of the knee  Advised not to use nsaids with the steroid and can use apap for additional pain control

## 2022-03-01 NOTE — PROGRESS NOTES
Assessment/Plan:    Pain and swelling of right knee  1 week hx of swelling of R knee with pain, warmth  There appears to be an anterior effusion, normal joint stabiity, non-tender to palpation  ROM is limited by swelling, reduced flexion  Swelling extends mildly through the ankle, pain more localized to the knee itself  Ambulating with limp  Would like pt evaluated by ortho, referral provided  If unable to be seen promptly, will have pt start medrol dose pack and have pt schedule US of the knee  Advised not to use nsaids with the steroid and can use apap for additional pain control  Elevated blood pressure reading in office without diagnosis of hypertension  No prior hx of HTN, elevated today in the office  Pt is in acute pain and has been taking nsaids for about a week which may be contributing  Recommend f/u in 1 month for reassessment  Diagnoses and all orders for this visit:    Pain and swelling of right knee  -     Ambulatory Referral to Orthopedic Surgery; Future  -     methylPREDNISolone 4 MG tablet therapy pack; Use as directed on package  -     US MSK limited; Future    Elevated blood pressure reading in office without diagnosis of hypertension          Subjective:      Patient ID: Samia Pagan is a 61 y o  male  Pt is a 60 yo male here today for evaluation of RLE pain  Pain started about a week ago with swelling in his knee  As the days went on, swelling progressed to his ankle and foot  No ankle or foot pain  He has itching on the skin of both legs with some excoriation noted from scratching, no rash appreciated  He notes he was away on vacation in [place], doing a lot of walking  He felt he was limping a lot, but he also noted that being in the pool here was alleviating  He subsequently started with some R hip pain as well, which he attributes to the limping  There was not trauma or injury prior to onset  He has been taking naproxen twice a day          The following portions of the patient's history were reviewed and updated as appropriate: allergies, current medications, past family history, past medical history, past social history, past surgical history and problem list     Review of Systems   Constitutional: Negative for appetite change, chills, diaphoresis, fatigue and fever  Respiratory: Negative for shortness of breath  Cardiovascular: Positive for leg swelling  Negative for chest pain  Musculoskeletal: Positive for arthralgias, gait problem and joint swelling  Skin: Positive for color change  Negative for rash  Itching of legs   Neurological: Negative for weakness and numbness  Objective:      /90   Pulse 90   Temp 97 8 °F (36 6 °C)   Resp 16   Ht 6' 1" (1 854 m)   Wt 103 kg (228 lb)   SpO2 99%   BMI 30 08 kg/m²          Physical Exam  Vitals reviewed  Constitutional:       General: He is awake  He is not in acute distress  Appearance: Normal appearance  He is well-developed and well-groomed  He is not ill-appearing  Cardiovascular:      Rate and Rhythm: Normal rate and regular rhythm  Pulses: Normal pulses  Pulmonary:      Effort: Pulmonary effort is normal    Musculoskeletal:      Cervical back: Normal range of motion  Right knee: Swelling, effusion and erythema (R knee mildly erythematous and warm) present  No bony tenderness  Decreased range of motion  Normal alignment  Left knee: Normal       Right lower leg: No tenderness or bony tenderness  1+ Edema present  Left lower leg: No edema  Right ankle: Swelling present  No deformity  No tenderness  Normal range of motion  Skin:     General: Skin is warm and dry  Findings: No erythema or rash  Comments: Excoriation on the B lower legs 2/2 scratching   Neurological:      Mental Status: He is alert and oriented to person, place, and time  Sensory: Sensation is intact  Motor: Motor function is intact  No weakness or abnormal muscle tone  Psychiatric:         Attention and Perception: Attention normal          Mood and Affect: Mood normal          Speech: Speech normal          Behavior: Behavior normal  Behavior is cooperative  Thought Content:  Thought content normal          Cognition and Memory: Cognition normal          Judgment: Judgment normal

## 2022-03-01 NOTE — ASSESSMENT & PLAN NOTE
No prior hx of HTN, elevated today in the office  Pt is in acute pain and has been taking nsaids for about a week which may be contributing  Recommend f/u in 1 month for reassessment

## 2022-03-07 ENCOUNTER — OFFICE VISIT (OUTPATIENT)
Dept: INTERNAL MEDICINE CLINIC | Facility: CLINIC | Age: 57
End: 2022-03-07
Payer: COMMERCIAL

## 2022-03-07 VITALS
BODY MASS INDEX: 27.83 KG/M2 | HEIGHT: 73 IN | TEMPERATURE: 98 F | HEART RATE: 90 BPM | SYSTOLIC BLOOD PRESSURE: 120 MMHG | RESPIRATION RATE: 16 BRPM | OXYGEN SATURATION: 99 % | WEIGHT: 210 LBS | DIASTOLIC BLOOD PRESSURE: 82 MMHG

## 2022-03-07 DIAGNOSIS — A69.23 LYME ARTHRITIS (HCC): Primary | ICD-10-CM

## 2022-03-07 PROCEDURE — 99213 OFFICE O/P EST LOW 20 MIN: CPT | Performed by: INTERNAL MEDICINE

## 2022-03-07 PROCEDURE — 1036F TOBACCO NON-USER: CPT | Performed by: INTERNAL MEDICINE

## 2022-03-07 PROCEDURE — 3008F BODY MASS INDEX DOCD: CPT | Performed by: INTERNAL MEDICINE

## 2022-03-07 NOTE — PROGRESS NOTES
Assessment/Plan:    1  [facility] ([facility]          A&P Notes:    1  Lyme Arthritis     · The patient has confirmed Lyme arthritis, PCR test of the right knee synovial fluid detected Lyme  · The patient was started on doxycycline  · The patient was having swelling of the right knee, swelling of the ankle, and pain in the right knee, without tenderness  Those symptoms are improving  · Has right knee stiffness and can't flex it fully (around 90 degrees), but extension is not an issue for him  He feels better and is trying to do movements as tolerated  · Was also having redness of the right knee, but it's also getting better    Plan:  · Continue Doxycycline and finish the ordered course  · Will continue to monitor the patient's progress    Subjective: Cristiane Victor is a 61 y o  male who was recently seen in the office because of swelling on the right knee with pain and warmth  He was not having any rash or   He was evaluated and referred to orthopedics  Further testing was done and the patient tested positive for Lyme on PCR test  He was having limited ROM of the knee, unable to fully flex  The patient was started on doxycycline  He feels that since starting antibiotics, his symptoms are getting better, his pain is slightly reduced, he is still limping but can ambulate better than before, his swelling of the knee is also much down  The patient recently noticed swelling on the left olecranon, that looks like olecranon bursitis, there is no pain with the movement, no tenderness, most likely related to microtrauma on the olecranon bursa  Patient ID: Sonya Hoff is a 61 y o  male  Past Medical History:   Diagnosis Date    Anxiety 10/16/2012    History of colon polyps         No family history on file       Social History     Socioeconomic History    Marital status: /Civil Union     Spouse name: Not on file    Number of children: Not on file    Years of education: Not on file    Highest education level: Not on file   Occupational History    Not on file   Tobacco Use    Smoking status: Never Smoker    Smokeless tobacco: Never Used   Substance and Sexual Activity    Alcohol use: Yes     Comment: socially    Drug use: No    Sexual activity: Not on file   Other Topics Concern    Not on file   Social History Narrative    Not on file     Social Determinants of Health     Financial Resource Strain: Not on file   Food Insecurity: Not on file   Transportation Needs: Not on file   Physical Activity: Not on file   Stress: Not on file   Social Connections: Not on file   Intimate Partner Violence: Not on file   Housing Stability: Not on file        Allergies   Allergen Reactions    Penicillins Hives    Procaine GI Intolerance        Current Outpatient Medications   Medication Sig Dispense Refill    busPIRone (BUSPAR) 5 mg tablet One tablet 3 times per day as needed for anxiety 60 tablet 5    citalopram (CeleXA) 20 mg tablet TAKE 1 TABLET BY MOUTH EVERY DAY 90 tablet 1    methylPREDNISolone 4 MG tablet therapy pack Use as directed on package 21 each 0    Multiple Vitamins-Minerals (Centrum Silver 50+Men) TABS Take by mouth      naproxen (NAPROSYN) 250 mg tablet Take 250 mg by mouth 2 (two) times a day with meals      Omega-3 Fatty Acids (FISH OIL PO) Take by mouth      VITAMIN D PO Take by mouth      zinc gluconate 50 mg tablet Take 50 mg by mouth daily       No current facility-administered medications for this visit  Review of Systems   Constitutional: Positive for activity change (Limping, getting better)  Negative for chills, fatigue, fever and unexpected weight change  HENT: Negative for congestion, ear discharge, facial swelling, sneezing and sore throat  Eyes: Negative for photophobia, discharge, redness and itching  Respiratory: Negative for apnea, chest tightness, shortness of breath and wheezing  Cardiovascular: Negative for chest pain and leg swelling     Gastrointestinal: Negative for abdominal distention and abdominal pain  Endocrine: Negative for cold intolerance and heat intolerance  Genitourinary: Negative for decreased urine volume, difficulty urinating, flank pain and urgency  Musculoskeletal: Positive for arthralgias and joint swelling (Right knee and ankle)  Negative for back pain, myalgias, neck pain and neck stiffness  Skin: Positive for color change (redness on the right knee)  Negative for rash and wound  Neurological: Negative for dizziness, syncope, light-headedness and headaches  Psychiatric/Behavioral: Negative for agitation and confusion  All other systems reviewed and are negative  Objective:      /82   Pulse 90   Temp 98 °F (36 7 °C)   Resp 16   Ht 6' 1" (1 854 m)   Wt 95 3 kg (210 lb)   SpO2 99%   BMI 27 71 kg/m²      Wt Readings from Last 3 Encounters:   03/07/22 95 3 kg (210 lb)   03/01/22 103 kg (228 lb)   09/08/21 98 kg (216 lb)     Temp Readings from Last 3 Encounters:   03/07/22 98 °F (36 7 °C)   03/01/22 97 8 °F (36 6 °C)   09/16/21 98 3 °F (36 8 °C)     BP Readings from Last 3 Encounters:   03/07/22 120/82   03/01/22 148/90   09/16/21 113/69     Pulse Readings from Last 3 Encounters:   03/07/22 90   03/01/22 90   09/16/21 98       Physical Exam  Constitutional:       General: He is not in acute distress  Appearance: Normal appearance  He is not ill-appearing  HENT:      Head: Normocephalic and atraumatic  Nose: No congestion or rhinorrhea  Mouth/Throat:      Pharynx: No oropharyngeal exudate or posterior oropharyngeal erythema  Eyes:      General: No scleral icterus  Right eye: No discharge  Left eye: No discharge  Cardiovascular:      Rate and Rhythm: Normal rate  Pulses: Normal pulses  Heart sounds: No murmur heard  Pulmonary:      Effort: Pulmonary effort is normal  No respiratory distress  Breath sounds: No wheezing  Abdominal:      General: There is no distension  Palpations: Abdomen is soft  Tenderness: There is no abdominal tenderness  There is no guarding  Musculoskeletal:         General: Swelling (Right knee, much better than before  Left olecranon bursa (unrelated to lyme)) present  No tenderness (No tenderness, but pain in the right knee with moving)  Cervical back: Normal range of motion  No rigidity or tenderness  Right lower leg: No edema  Left lower leg: No edema  Comments: Stiffness in the right knee, getting better   Skin:     Capillary Refill: Capillary refill takes less than 2 seconds  Coloration: Skin is not jaundiced  Findings: No bruising  Comments: Redness on the right knee   Neurological:      Mental Status: He is alert and oriented to person, place, and time  Cranial Nerves: No cranial nerve deficit  Motor: No weakness  Gait: Gait abnormal (Limping, but better than before)  Psychiatric:         Mood and Affect: Mood normal          Behavior: Behavior normal          Thought Content: Thought content normal          Judgment: Judgment normal             I have reviewed pertinent labs:   Most Recent Labs:   Lab Results   Component Value Date    WBC 5 28 06/29/2021    RBC 4 74 06/29/2021    HGB 14 1 06/29/2021    HCT 42 3 06/29/2021     06/29/2021    RDW 12 9 06/29/2021    NEUTROABS 3 18 06/29/2021    SODIUM 137 06/29/2021    K 3 7 06/29/2021     06/29/2021    CO2 27 06/29/2021    BUN 13 06/29/2021    CREATININE 0 90 06/29/2021    GLUC 89 03/01/2017    GLUF 89 06/29/2021    CALCIUM 9 0 06/29/2021    AST 25 06/29/2021    ALT 38 06/29/2021    ALKPHOS 53 06/29/2021    TP 7 1 06/29/2021    ALB 3 7 06/29/2021    TBILI 0 74 06/29/2021    CHOLESTEROL 195 06/29/2021    HDL 48 06/29/2021    TRIG 110 06/29/2021    LDLCALC 125 (H) 06/29/2021    [place] 147 06/29/2021

## 2022-04-01 ENCOUNTER — OFFICE VISIT (OUTPATIENT)
Dept: INTERNAL MEDICINE CLINIC | Facility: CLINIC | Age: 57
End: 2022-04-01
Payer: COMMERCIAL

## 2022-04-01 VITALS
DIASTOLIC BLOOD PRESSURE: 78 MMHG | BODY MASS INDEX: 28.76 KG/M2 | OXYGEN SATURATION: 97 % | HEART RATE: 86 BPM | SYSTOLIC BLOOD PRESSURE: 126 MMHG | HEIGHT: 73 IN | WEIGHT: 217 LBS | RESPIRATION RATE: 15 BRPM

## 2022-04-01 DIAGNOSIS — F41.9 ANXIETY: ICD-10-CM

## 2022-04-01 DIAGNOSIS — A69.23 LYME ARTHRITIS (HCC): Primary | ICD-10-CM

## 2022-04-01 DIAGNOSIS — F41.0 PANIC DISORDER: ICD-10-CM

## 2022-04-01 PROCEDURE — 99213 OFFICE O/P EST LOW 20 MIN: CPT | Performed by: INTERNAL MEDICINE

## 2022-04-01 RX ORDER — CITALOPRAM 20 MG/1
20 TABLET ORAL DAILY
Qty: 90 TABLET | Refills: 2 | Status: SHIPPED | OUTPATIENT
Start: 2022-04-01

## 2022-04-01 RX ORDER — DOXYCYCLINE 100 MG/1
100 TABLET ORAL 2 TIMES DAILY
Qty: 56 TABLET | Refills: 0 | Status: SHIPPED | OUTPATIENT
Start: 2022-04-01 | End: 2022-04-29

## 2022-04-01 NOTE — PROGRESS NOTES
INTERNAL MEDICINE OFFICE VISIT       NAME: Torin Holly  AGE: 61 y o  SEX: male       : 1965        MRN: [id number]    DATE: 2022  TIME: 8:59 AM    Assessment and Plan   1  Lyme arthritis ([address] )  -     doxycycline (ADOXA) 100 MG tablet; Take 1 tablet (100 mg total) by mouth 2 (two) times a day for 28 days    2  Anxiety  -     citalopram (CeleXA) 20 mg tablet; Take 1 tablet (20 mg total) by mouth daily    3  Panic disorder  -     citalopram (CeleXA) 20 mg tablet; Take 1 tablet (20 mg total) by mouth daily         Plan:    Lyme arthritis of right knee:  Improvement is about 50-60% after 28 days of doxycycline  As per current guideline recommendations, doxycycline will be extended another 28 days  Kimberley Hyde will stay active but avoid too much exercise that involves weight-bearing on his knee  He will take naproxen 250 mg with food twice a day as needed with plenty of water, for knee swelling and pain  He will be seen in 28 days for follow-up and was encouraged to call sooner for any questions or problems  Situational anxiety is much improved with citalopram which was renewed  This is work related  He has not had to use BuSpar since starting citalopram         Chief Complaint     Chief Complaint   Patient presents with    Follow-up       History of Present Illness   Torin Holly is a 61y o -year-old male who is here for a 4 week follow-up visit  Kimberley Hyde just completed his 28th day of doxycycline last evening  His right knee is improved about 50 60%  Swelling is reduce, redness is gone, pain is diminished but still present  This is particularly case when going up and down steps, or on days where he is very physically active  He had 1 day couple weeks ago where he achieved 20,000 steps a day putting for relies on his lawn and walking outdoors and then going up and down steps in his home cleaning out an attic    We discussed avoiding this level physical activity and keep physical activity constant, ideally between 5 in 10,000 steps a day  His right foot and right hip discomfort have resolved  There are no systemic symptoms reported such as fever, chills or sweating  He has no neurologic symptoms, no headache  There are no cardiac symptoms  There are no new complaints  Review of Systems   Review of Systems since see above    Active Problem List     Patient Active Problem List   Diagnosis    History of colon polyps    Post-traumatic osteoarthritis of right knee    Anxiety    Panic disorder    Pain and swelling of right knee    Elevated blood pressure reading in office without diagnosis of hypertension       The following portions of the patient's history were reviewed and updated as appropriate: allergies, current medications, past family history, past medical history, past social history, past surgical history, and problem list     Objective     Vitals:    04/01/22 0801   BP: 126/78   Pulse: 86   Resp: 15   SpO2: 97%     Wt Readings from Last 3 Encounters:   04/01/22 98 4 kg (217 lb)   03/07/22 95 3 kg (210 lb)   03/01/22 103 kg (228 lb)       Physical Exam     Vital signs stable, alert and oriented in no distress  Gait is slightly antalgic but overall gait is much improved compared to last visit  Mood is optimal     Right leg:  No edema, circulation intact  Right knee: There is a mild effusion of the right knee, no tenderness or redness, with some slight limitation of flexion extension, no instability  There is no popliteal mass or tenderness    Popliteal pulse is normal                 ALLERGIES:  Allergies   Allergen Reactions    Penicillins Hives    Procaine GI Intolerance       Current Medications     Current Outpatient Medications   Medication Sig Dispense Refill    busPIRone (BUSPAR) 5 mg tablet One tablet 3 times per day as needed for anxiety 60 tablet 5    citalopram (CeleXA) 20 mg tablet Take 1 tablet (20 mg total) by mouth daily 90 tablet 2    Multiple Vitamins-Minerals (Centrum Silver 50+Men) TABS Take by mouth      Omega-3 Fatty Acids (FISH OIL PO) Take by mouth      VITAMIN D PO Take by mouth      zinc gluconate 50 mg tablet Take 50 mg by mouth daily      doxycycline (ADOXA) 100 MG tablet Take 1 tablet (100 mg total) by mouth 2 (two) times a day for 28 days 56 tablet 0     No current facility-administered medications for this visit           Parag Cardoso MD

## 2022-04-22 ENCOUNTER — OFFICE VISIT (OUTPATIENT)
Dept: INTERNAL MEDICINE CLINIC | Facility: CLINIC | Age: 57
End: 2022-04-22
Payer: COMMERCIAL

## 2022-04-22 ENCOUNTER — APPOINTMENT (OUTPATIENT)
Dept: RADIOLOGY | Facility: MEDICAL CENTER | Age: 57
End: 2022-04-22
Payer: COMMERCIAL

## 2022-04-22 ENCOUNTER — APPOINTMENT (OUTPATIENT)
Dept: LAB | Facility: MEDICAL CENTER | Age: 57
End: 2022-04-22
Payer: COMMERCIAL

## 2022-04-22 VITALS
HEIGHT: 73 IN | HEART RATE: 78 BPM | DIASTOLIC BLOOD PRESSURE: 80 MMHG | BODY MASS INDEX: 28.76 KG/M2 | WEIGHT: 217 LBS | RESPIRATION RATE: 17 BRPM | TEMPERATURE: 97 F | OXYGEN SATURATION: 98 % | SYSTOLIC BLOOD PRESSURE: 150 MMHG

## 2022-04-22 DIAGNOSIS — M25.462 EFFUSION OF LEFT KNEE: ICD-10-CM

## 2022-04-22 DIAGNOSIS — A69.23 LYME ARTHRITIS OF KNEE (HCC): Primary | ICD-10-CM

## 2022-04-22 PROBLEM — M25.461 PAIN AND SWELLING OF RIGHT KNEE: Status: RESOLVED | Noted: 2022-03-01 | Resolved: 2022-04-22

## 2022-04-22 PROBLEM — R03.0 ELEVATED BLOOD PRESSURE READING IN OFFICE WITHOUT DIAGNOSIS OF HYPERTENSION: Status: RESOLVED | Noted: 2022-03-01 | Resolved: 2022-04-22

## 2022-04-22 PROBLEM — M25.561 PAIN AND SWELLING OF RIGHT KNEE: Status: RESOLVED | Noted: 2022-03-01 | Resolved: 2022-04-22

## 2022-04-22 LAB
BASOPHILS # BLD AUTO: 0.05 THOUSANDS/ΜL (ref 0–0.1)
BASOPHILS NFR BLD AUTO: 1 % (ref 0–1)
CRP SERPL QL: 17.6 MG/L
EOSINOPHIL # BLD AUTO: 0.14 THOUSAND/ΜL (ref 0–0.61)
EOSINOPHIL NFR BLD AUTO: 2 % (ref 0–6)
ERYTHROCYTE [DISTWIDTH] IN BLOOD BY AUTOMATED COUNT: 13 % (ref 11.6–15.1)
HCT VFR BLD AUTO: 43.1 % (ref 36.5–49.3)
HGB BLD-MCNC: 14.2 G/DL (ref 12–17)
IMM GRANULOCYTES # BLD AUTO: 0.03 THOUSAND/UL (ref 0–0.2)
IMM GRANULOCYTES NFR BLD AUTO: 0 % (ref 0–2)
LYMPHOCYTES # BLD AUTO: 1.67 THOUSANDS/ΜL (ref 0.6–4.47)
LYMPHOCYTES NFR BLD AUTO: 24 % (ref 14–44)
MCH RBC QN AUTO: 28.7 PG (ref 26.8–34.3)
MCHC RBC AUTO-ENTMCNC: 32.9 G/DL (ref 31.4–37.4)
MCV RBC AUTO: 87 FL (ref 82–98)
MONOCYTES # BLD AUTO: 0.96 THOUSAND/ΜL (ref 0.17–1.22)
MONOCYTES NFR BLD AUTO: 14 % (ref 4–12)
NEUTROPHILS # BLD AUTO: 4.02 THOUSANDS/ΜL (ref 1.85–7.62)
NEUTS SEG NFR BLD AUTO: 59 % (ref 43–75)
NRBC BLD AUTO-RTO: 0 /100 WBCS
PLATELET # BLD AUTO: 299 THOUSANDS/UL (ref 149–390)
PMV BLD AUTO: 9.4 FL (ref 8.9–12.7)
RBC # BLD AUTO: 4.95 MILLION/UL (ref 3.88–5.62)
URATE SERPL-MCNC: 5.3 MG/DL (ref 4.2–8)
WBC # BLD AUTO: 6.87 THOUSAND/UL (ref 4.31–10.16)

## 2022-04-22 PROCEDURE — 1036F TOBACCO NON-USER: CPT | Performed by: INTERNAL MEDICINE

## 2022-04-22 PROCEDURE — 3008F BODY MASS INDEX DOCD: CPT | Performed by: INTERNAL MEDICINE

## 2022-04-22 PROCEDURE — 3725F SCREEN DEPRESSION PERFORMED: CPT | Performed by: INTERNAL MEDICINE

## 2022-04-22 PROCEDURE — 84550 ASSAY OF BLOOD/URIC ACID: CPT | Performed by: INTERNAL MEDICINE

## 2022-04-22 PROCEDURE — 99213 OFFICE O/P EST LOW 20 MIN: CPT | Performed by: INTERNAL MEDICINE

## 2022-04-22 PROCEDURE — 86140 C-REACTIVE PROTEIN: CPT | Performed by: INTERNAL MEDICINE

## 2022-04-22 PROCEDURE — 36415 COLL VENOUS BLD VENIPUNCTURE: CPT | Performed by: INTERNAL MEDICINE

## 2022-04-22 PROCEDURE — 85025 COMPLETE CBC W/AUTO DIFF WBC: CPT | Performed by: INTERNAL MEDICINE

## 2022-04-22 PROCEDURE — 86430 RHEUMATOID FACTOR TEST QUAL: CPT | Performed by: INTERNAL MEDICINE

## 2022-04-22 PROCEDURE — 86038 ANTINUCLEAR ANTIBODIES: CPT

## 2022-04-22 PROCEDURE — 73562 X-RAY EXAM OF KNEE 3: CPT

## 2022-04-22 NOTE — ASSESSMENT & PLAN NOTE
Present with a new onset of left knee swelling and tenderness  For the last week  Denies fever, chills, recent trauma  No other joints involved  Patient has hx of Lyme arthritis and is completing the second course of 28 days course of doxycycline  The PCR result of the right knee synovial fluid detected Lyme  R knee with improvement  Patient is to complete the course of AB by the end of the month  Considered less likely infection related  Most likely autoimmune related  Will order autoimmune markers:  TIRSO, RF, C-reactive protein, uric acid and CBC  Ordered left knee x ray  Will consider ID/rheumatology referral depending the lab work results      Recommended Aleve 2 tables BID

## 2022-04-22 NOTE — ASSESSMENT & PLAN NOTE
Continue with the doxycycline 100 mg 2 times a day for a total of 28 days course    Improvement in condition

## 2022-04-22 NOTE — PROGRESS NOTES
Assessment and Plan:    Effusion of left knee  Present with a new onset of left knee swelling and tenderness  For the last week  Denies fever, chills, recent trauma  No other joints involved  Patient has hx of Lyme arthritis and is completing the second course of 28 days course of doxycycline  The PCR result of the right knee synovial fluid detected Lyme  R knee with improvement  Patient is to complete the course of AB by the end of the month  Considered less likely infection related  Most likely autoimmune related  Will order autoimmune markers:  TIRSO, RF, C-reactive protein, uric acid and CBC  Ordered left knee x ray  Will consider ID/rheumatology referral depending the lab work results  Recommended Aleve 2 tables BID      Lyme arthritis of knee (HCC)  Continue with the doxycycline 100 mg 2 times a day for a total of 28 days course  Improvement in condition       Subjective:     Patient ID: Latosha Justice is a 61 y o  male  Patient present to the clinic due to change in medical condition  Patient with hx of Lyme arthritis and following the second course of doxycycline  Presents with the left knee inflammation and tenderness that started a week ago  Patient denies fever, chills, local trauma, redness or warmth to touch  No acute pain during night  Reports tenderness with movement but admits bearing weight  Patient took Aleve yesterday evening and today morning  Was recommended to do further work up for autoimmune disease and based on the results will procede with the recommendations  Meanwhile recommended NSAIDs for pain and inflamation  Patient will be contacted other the phone to discuss the lab work results  Patient has no other complaints at this time  Review of Systems   Constitutional: Negative for chills and fever  HENT: Negative for ear pain and sore throat  Eyes: Negative for pain and visual disturbance  Respiratory: Negative for cough and shortness of breath  Cardiovascular: Negative for chest pain and palpitations  Gastrointestinal: Negative for abdominal pain and vomiting  Genitourinary: Negative for dysuria and hematuria  Musculoskeletal: Positive for arthralgias and joint swelling  Negative for back pain  Skin: Negative for color change and rash  Neurological: Negative for seizures and syncope  All other systems reviewed and are negative  Patient Active Problem List   Diagnosis    History of colon polyps    Post-traumatic osteoarthritis of right knee    Anxiety    Panic disorder    Lyme arthritis of knee (HCC)    Effusion of left knee        Current Outpatient Medications   Medication Sig Dispense Refill    busPIRone (BUSPAR) 5 mg tablet One tablet 3 times per day as needed for anxiety 60 tablet 5    citalopram (CeleXA) 20 mg tablet Take 1 tablet (20 mg total) by mouth daily 90 tablet 2    doxycycline (ADOXA) 100 MG tablet Take 1 tablet (100 mg total) by mouth 2 (two) times a day for 28 days 56 tablet 0    Multiple Vitamins-Minerals (Centrum Silver 50+Men) TABS Take by mouth      Omega-3 Fatty Acids (FISH OIL PO) Take by mouth      VITAMIN D PO Take by mouth       No current facility-administered medications for this visit  Allergies   Allergen Reactions    Penicillins Hives    Procaine GI Intolerance        The following portions of the patient's history were reviewed and updated as appropriate: allergies, current medications, past family history, past medical history, past social history, past surgical history and problem list           Objective:    /80 (BP Location: Left arm, Patient Position: Sitting, Cuff Size: Adult)   Pulse 78   Temp (!) 97 °F (36 1 °C) (Tympanic)   Resp 17   Ht 6' 1" (1 854 m)   Wt 98 4 kg (217 lb)   SpO2 98%   BMI 28 63 kg/m²      Body mass index is 28 63 kg/m²  Physical Exam  Vitals and nursing note reviewed  Constitutional:       Appearance: Normal appearance   He is well-developed  HENT:      Head: Normocephalic and atraumatic  Nose: Nose normal    Eyes:      Conjunctiva/sclera: Conjunctivae normal    Cardiovascular:      Rate and Rhythm: Normal rate and regular rhythm  Heart sounds: No murmur heard  Pulmonary:      Effort: Pulmonary effort is normal  No respiratory distress  Breath sounds: Normal breath sounds  Abdominal:      General: Bowel sounds are normal       Palpations: Abdomen is soft  Tenderness: There is no abdominal tenderness  Musculoskeletal:         General: Swelling (Left knee) present  No tenderness  Cervical back: Neck supple  Right lower leg: No edema  Left lower leg: No edema  Skin:     General: Skin is warm and dry  Neurological:      General: No focal deficit present  Mental Status: He is alert and oriented to person, place, and time  Mental status is at baseline  Psychiatric:         Mood and Affect: Mood normal          Behavior: Behavior normal          Thought Content:  Thought content normal          Judgment: Judgment normal

## 2022-04-23 LAB — ANA TITR SER IF: NEGATIVE {TITER}

## 2022-04-25 DIAGNOSIS — A69.23 LYME ARTHRITIS (HCC): Primary | ICD-10-CM

## 2022-04-25 LAB — RHEUMATOID FACT SER QL LA: NEGATIVE

## 2022-04-25 NOTE — PROGRESS NOTES
I just spoke to Andreia Berg  He is feeling better overall  Right knee is essentially asymptomatic with no pain or swelling  Left knee swelling and discomfort have diminished  He was active over the weekend  He has no systemic symptoms or any new joint complaints  His labs show normal uric acid level, negative TIRSO, stable CBC with mild monocytosis with an elevated CRP of 17 6  Rheumatoid factor is pending  At this time, plan is observation  He will finish his 2nd 30 day course of doxycycline at the end of this week  Working diagnosis is resolving right knee Lyme arthritis with possible non infectious post Lyme arthritis of the left knee  Andreia Berg agrees to contact me if his symptoms become worse and matter what, contact me in 7 days regarding his status

## 2022-04-27 NOTE — RESULT ENCOUNTER NOTE
Chuy Ohara,  Your knee xray showed and effusion, as expected, with no other findings, which is good  As we discussed, please finish Doxycycline and let me know how your left knee is doing at the end of the week     Thanks,  Issa Shelton

## 2022-06-08 DIAGNOSIS — M17.11 ARTHRITIS OF RIGHT KNEE: Primary | ICD-10-CM

## 2022-06-08 RX ORDER — PREDNISONE 20 MG/1
20 TABLET ORAL DAILY
Qty: 5 TABLET | Refills: 0 | Status: SHIPPED | OUTPATIENT
Start: 2022-06-08 | End: 2022-06-13

## 2022-06-13 ENCOUNTER — OFFICE VISIT (OUTPATIENT)
Dept: INTERNAL MEDICINE CLINIC | Facility: CLINIC | Age: 57
End: 2022-06-13
Payer: COMMERCIAL

## 2022-06-13 VITALS
RESPIRATION RATE: 18 BRPM | HEIGHT: 73 IN | DIASTOLIC BLOOD PRESSURE: 80 MMHG | TEMPERATURE: 97 F | HEART RATE: 76 BPM | WEIGHT: 214 LBS | BODY MASS INDEX: 28.36 KG/M2 | OXYGEN SATURATION: 97 % | SYSTOLIC BLOOD PRESSURE: 120 MMHG

## 2022-06-13 DIAGNOSIS — A69.23 LYME ARTHRITIS OF KNEE (HCC): ICD-10-CM

## 2022-06-13 DIAGNOSIS — M25.462 EFFUSION OF LEFT KNEE: Primary | ICD-10-CM

## 2022-06-13 PROCEDURE — 3008F BODY MASS INDEX DOCD: CPT | Performed by: INTERNAL MEDICINE

## 2022-06-13 PROCEDURE — 99213 OFFICE O/P EST LOW 20 MIN: CPT | Performed by: INTERNAL MEDICINE

## 2022-06-13 PROCEDURE — 1036F TOBACCO NON-USER: CPT | Performed by: INTERNAL MEDICINE

## 2022-06-13 NOTE — PROGRESS NOTES
Assessment/Plan:    1  Effusion of left knee  Ambulatory Referral to Infectious Disease   2  Lyme arthritis of knee [facility])  Ambulatory Referral to Infectious Disease        A&P Notes:   - Will refer to ID for 1 month of IV antibiotics  - If after this, pain is persistent and/or ID deems fit, will then refer to rheumatology for DMARDs/further workup    Subjective:      Patient ID: Mary Hughes is a 61 y o  male  He presents for follow up on his knee pain  His pain originally started in the right knee, and serology of his joint effusion was positive for Lyme  He has now completed 2 months of oral doxycycline  Since then, the right knee has greatly improved, but most recently his left knee was giving him trouble  He experienced pain, swelling, difficulty walking and temporary loss of range of motion secondary to stiffness and pain  He was prescribed 5 days of prednisone 20mg on 6/8 by Dr Rhoda Garcia which improved his symptoms  He now is able to externally rotate his hip and knee without much pain, his full ROM is now only minimally decreased due to stiffness  He was doing house work a few weeks ago and used a ladder, and felt a flare up in his knees after that  In addition he has some posterior knee pain with prolonged sitting  He denies any systemic symptoms such as fevers, chills, malaise, any other arthralgias, SOB, chest pain, abdominal pain  Differential includes post-Lyme arthritis, reactive arthritis, osteoarthritis  Past Medical History:   Diagnosis Date    Anxiety 10/16/2012    History of colon polyps         History reviewed  No pertinent family history       Social History     Socioeconomic History    Marital status: /Civil Union     Spouse name: Not on file    Number of children: Not on file    Years of education: Not on file    Highest education level: Not on file   Occupational History    Not on file   Tobacco Use    Smoking status: Never Smoker    Smokeless tobacco: Never Used Substance and Sexual Activity    Alcohol use: Yes     Alcohol/week: 0 0 standard drinks     Comment: socially    Drug use: No    Sexual activity: Not on file   Other Topics Concern    Not on file   Social History Narrative    Not on file     Social Determinants of Health     Financial Resource Strain: Not on file   Food Insecurity: Not on file   Transportation Needs: Not on file   Physical Activity: Not on file   Stress: Not on file   Social Connections: Not on file   Intimate Partner Violence: Not on file   Housing Stability: Not on file        Allergies   Allergen Reactions    Penicillins Hives    Procaine GI Intolerance        Current Outpatient Medications   Medication Sig Dispense Refill    busPIRone (BUSPAR) 5 mg tablet One tablet 3 times per day as needed for anxiety 60 tablet 5    citalopram (CeleXA) 20 mg tablet Take 1 tablet (20 mg total) by mouth daily 90 tablet 2    Multiple Vitamins-Minerals (Centrum Silver 50+Men) TABS Take by mouth      Omega-3 Fatty Acids (FISH OIL PO) Take by mouth      predniSONE 20 mg tablet Take 1 tablet (20 mg total) by mouth daily for 5 days 5 tablet 0    VITAMIN D PO Take by mouth       No current facility-administered medications for this visit  Review of Systems   Constitutional: Negative for chills, fatigue and fever  Respiratory: Negative for choking, chest tightness and shortness of breath  Gastrointestinal: Negative for abdominal pain  Musculoskeletal: Positive for arthralgias and joint swelling  Negative for myalgias  Skin: Negative for color change, pallor and rash          Objective:      /80 (BP Location: Left arm, Patient Position: Sitting, Cuff Size: Adult)   Pulse 76   Temp (!) 97 °F (36 1 °C) (Tympanic)   Resp 18   Ht 6' 1" (1 854 m)   Wt 97 1 kg (214 lb)   SpO2 97%   BMI 28 23 kg/m²      Wt Readings from Last 3 Encounters:   06/13/22 97 1 kg (214 lb)   04/22/22 98 4 kg (217 lb)   04/01/22 98 4 kg (217 lb)     Temp Readings from Last 3 Encounters:   06/13/22 (!) 97 °F (36 1 °C) (Tympanic)   04/22/22 (!) 97 °F (36 1 °C) (Tympanic)   03/07/22 98 °F (36 7 °C)     BP Readings from Last 3 Encounters:   06/13/22 120/80   04/22/22 150/80   04/01/22 126/78     Pulse Readings from Last 3 Encounters:   06/13/22 76   04/22/22 78   04/01/22 86       Physical Exam  Constitutional:       General: He is not in acute distress  Appearance: Normal appearance  He is not ill-appearing  HENT:      Head: Normocephalic and atraumatic  Mouth/Throat:      Mouth: Mucous membranes are moist       Pharynx: Oropharynx is clear  Eyes:      Extraocular Movements: Extraocular movements intact  Conjunctiva/sclera: Conjunctivae normal    Pulmonary:      Effort: Pulmonary effort is normal    Musculoskeletal:      Cervical back: Normal range of motion and neck supple  Right knee: Effusion present  No bony tenderness  Normal range of motion  No tenderness  Normal alignment  Left knee: Effusion (anteromedial) present  No bony tenderness  Decreased range of motion  No tenderness  Normal alignment  Skin:     General: Skin is warm and dry  Neurological:      General: No focal deficit present  Mental Status: He is alert  I have reviewed pertinent labs:   Most Recent Labs:   Lab Results   Component Value Date    WBC 6 87 04/22/2022    RBC 4 95 04/22/2022    HGB 14 2 04/22/2022    HCT 43 1 04/22/2022     04/22/2022    RDW 13 0 04/22/2022    NEUTROABS 4 02 04/22/2022    SODIUM 137 06/29/2021    K 3 7 06/29/2021     06/29/2021    CO2 27 06/29/2021    BUN 13 06/29/2021    CREATININE 0 90 06/29/2021    GLUC 89 03/01/2017    GLUF 89 06/29/2021    CALCIUM 9 0 06/29/2021    AST 25 06/29/2021    ALT 38 06/29/2021    ALKPHOS 53 06/29/2021    TP 7 1 06/29/2021    ALB 3 7 06/29/2021    TBILI 0 74 06/29/2021    CHOLESTEROL 195 06/29/2021    HDL 48 06/29/2021    TRIG 110 06/29/2021    LDLCALC 125 (H) 06/29/2021    NONHDLC 147 06/29/2021

## 2022-06-14 ENCOUNTER — TELEPHONE (OUTPATIENT)
Dept: INFECTIOUS DISEASES | Facility: CLINIC | Age: 57
End: 2022-06-14

## 2022-06-14 NOTE — TELEPHONE ENCOUNTER
Pt was told he should be seen sooner rather than later for his lyme arthrititis of knee  Did make him aware soonest available at the moment is the week of July 19th  He asked if a provider could review his chart and see if can get in sooner

## 2022-06-14 NOTE — TELEPHONE ENCOUNTER
Sent a message to the referring provider  Pt chart has been reviewed by the in office provider  Per provider pt has already completed 2 month course of Doxy which is sufficient enough for treating lyme arthritis  If there is consideration for IV abx then pt should have repeat arthrocentesis done with repeat Lyme PCR of synovial fluid  Made referring provider aware that we can reach out to the patient as well and notify them of what would be needed prior to possible appt

## 2022-06-15 ENCOUNTER — TELEPHONE (OUTPATIENT)
Dept: INTERNAL MEDICINE CLINIC | Facility: CLINIC | Age: 57
End: 2022-06-15

## 2022-06-15 ENCOUNTER — DOCUMENTATION (OUTPATIENT)
Dept: INTERNAL MEDICINE CLINIC | Facility: CLINIC | Age: 57
End: 2022-06-15

## 2022-06-15 DIAGNOSIS — M25.462 EFFUSION OF LEFT KNEE: Primary | ICD-10-CM

## 2022-06-15 DIAGNOSIS — Z86.19 HISTORY OF LYME DISEASE: ICD-10-CM

## 2022-06-15 NOTE — PROGRESS NOTES
Dr Roldan Hopkins reviewed pt chart  Per Dr Roldan Hopkins Pt has already completed 2 month course of doxy which is more than adequate for Lyme arthritis  If there is a consideration for iv antibiotic therapy for Lyme arthritis, pt should have repeat arthrocentesis done with repeat Lyme PCR of synovial fluid    Message sent to referring provider and waiting to hear back, if not within 2 days will notify pt        Dr Chilo Stokes this is the message I saw in the referral you sent  I think the message went to [facility]     Not sure what you want to so?

## 2022-06-16 ENCOUNTER — TELEPHONE (OUTPATIENT)
Dept: OBGYN CLINIC | Facility: MEDICAL CENTER | Age: 57
End: 2022-06-16

## 2023-01-27 DIAGNOSIS — F41.0 PANIC DISORDER: ICD-10-CM

## 2023-01-27 DIAGNOSIS — F41.9 ANXIETY: ICD-10-CM

## 2023-01-27 RX ORDER — CITALOPRAM 20 MG/1
TABLET ORAL
Qty: 90 TABLET | Refills: 1 | Status: SHIPPED | OUTPATIENT
Start: 2023-01-27

## 2023-06-22 DIAGNOSIS — F41.0 PANIC DISORDER: ICD-10-CM

## 2023-06-22 DIAGNOSIS — F41.9 ANXIETY: ICD-10-CM

## 2023-06-22 RX ORDER — CITALOPRAM 20 MG/1
20 TABLET ORAL DAILY
Qty: 90 TABLET | Refills: 1 | Status: SHIPPED | OUTPATIENT
Start: 2023-06-22 | End: 2023-06-22 | Stop reason: SDUPTHER

## 2023-06-22 RX ORDER — CITALOPRAM 20 MG/1
20 TABLET ORAL DAILY
Qty: 90 TABLET | Refills: 1 | Status: SHIPPED | OUTPATIENT
Start: 2023-06-22

## 2023-07-25 DIAGNOSIS — Z12.5 SCREENING FOR PROSTATE CANCER: ICD-10-CM

## 2023-07-25 DIAGNOSIS — Z13.0 SCREENING FOR DEFICIENCY ANEMIA: ICD-10-CM

## 2023-07-25 DIAGNOSIS — Z13.1 SCREENING FOR DIABETES MELLITUS: Primary | ICD-10-CM

## 2023-07-25 DIAGNOSIS — Z13.220 SCREENING FOR HYPERLIPIDEMIA: ICD-10-CM

## 2023-07-27 ENCOUNTER — APPOINTMENT (OUTPATIENT)
Dept: LAB | Facility: MEDICAL CENTER | Age: 58
End: 2023-07-27
Payer: COMMERCIAL

## 2023-07-27 DIAGNOSIS — Z13.220 SCREENING FOR HYPERLIPIDEMIA: ICD-10-CM

## 2023-07-27 LAB
ALBUMIN SERPL BCP-MCNC: 4 G/DL (ref 3.5–5)
ALP SERPL-CCNC: 58 U/L (ref 46–116)
ALT SERPL W P-5'-P-CCNC: 37 U/L (ref 12–78)
ANION GAP SERPL CALCULATED.3IONS-SCNC: 3 MMOL/L
AST SERPL W P-5'-P-CCNC: 19 U/L (ref 5–45)
BACTERIA UR QL AUTO: ABNORMAL /HPF
BASOPHILS # BLD AUTO: 0.05 THOUSANDS/ÂΜL (ref 0–0.1)
BASOPHILS NFR BLD AUTO: 1 % (ref 0–1)
BILIRUB SERPL-MCNC: 0.93 MG/DL (ref 0.2–1)
BILIRUB UR QL STRIP: NEGATIVE
BUN SERPL-MCNC: 20 MG/DL (ref 5–25)
CALCIUM SERPL-MCNC: 9.5 MG/DL (ref 8.3–10.1)
CHLORIDE SERPL-SCNC: 107 MMOL/L (ref 96–108)
CHOLEST SERPL-MCNC: 205 MG/DL
CLARITY UR: CLEAR
CO2 SERPL-SCNC: 30 MMOL/L (ref 21–32)
COLOR UR: YELLOW
CREAT SERPL-MCNC: 0.98 MG/DL (ref 0.6–1.3)
EOSINOPHIL # BLD AUTO: 0.21 THOUSAND/ÂΜL (ref 0–0.61)
EOSINOPHIL NFR BLD AUTO: 3 % (ref 0–6)
ERYTHROCYTE [DISTWIDTH] IN BLOOD BY AUTOMATED COUNT: 12.7 % (ref 11.6–15.1)
EST. AVERAGE GLUCOSE BLD GHB EST-MCNC: 111 MG/DL
GFR SERPL CREATININE-BSD FRML MDRD: 84 ML/MIN/1.73SQ M
GLUCOSE P FAST SERPL-MCNC: 93 MG/DL (ref 65–99)
GLUCOSE UR STRIP-MCNC: NEGATIVE MG/DL
HBA1C MFR BLD: 5.5 %
HCT VFR BLD AUTO: 48 % (ref 36.5–49.3)
HDLC SERPL-MCNC: 55 MG/DL
HGB BLD-MCNC: 16.1 G/DL (ref 12–17)
HGB UR QL STRIP.AUTO: NEGATIVE
IMM GRANULOCYTES # BLD AUTO: 0.03 THOUSAND/UL (ref 0–0.2)
IMM GRANULOCYTES NFR BLD AUTO: 1 % (ref 0–2)
KETONES UR STRIP-MCNC: NEGATIVE MG/DL
LDLC SERPL CALC-MCNC: 129 MG/DL (ref 0–100)
LEUKOCYTE ESTERASE UR QL STRIP: NEGATIVE
LYMPHOCYTES # BLD AUTO: 1.22 THOUSANDS/ÂΜL (ref 0.6–4.47)
LYMPHOCYTES NFR BLD AUTO: 19 % (ref 14–44)
MCH RBC QN AUTO: 30 PG (ref 26.8–34.3)
MCHC RBC AUTO-ENTMCNC: 33.5 G/DL (ref 31.4–37.4)
MCV RBC AUTO: 90 FL (ref 82–98)
MONOCYTES # BLD AUTO: 0.73 THOUSAND/ÂΜL (ref 0.17–1.22)
MONOCYTES NFR BLD AUTO: 11 % (ref 4–12)
MUCOUS THREADS UR QL AUTO: ABNORMAL
NEUTROPHILS # BLD AUTO: 4.16 THOUSANDS/ÂΜL (ref 1.85–7.62)
NEUTS SEG NFR BLD AUTO: 65 % (ref 43–75)
NITRITE UR QL STRIP: NEGATIVE
NON-SQ EPI CELLS URNS QL MICRO: ABNORMAL /HPF
NONHDLC SERPL-MCNC: 150 MG/DL
NRBC BLD AUTO-RTO: 0 /100 WBCS
PH UR STRIP.AUTO: 6 [PH]
PLATELET # BLD AUTO: 269 THOUSANDS/UL (ref 149–390)
PMV BLD AUTO: 9.5 FL (ref 8.9–12.7)
POTASSIUM SERPL-SCNC: 4.7 MMOL/L (ref 3.5–5.3)
PROT SERPL-MCNC: 7.6 G/DL (ref 6.4–8.4)
PROT UR STRIP-MCNC: ABNORMAL MG/DL
PSA SERPL-MCNC: 1.43 NG/ML (ref 0–4)
RBC # BLD AUTO: 5.36 MILLION/UL (ref 3.88–5.62)
RBC #/AREA URNS AUTO: ABNORMAL /HPF
SODIUM SERPL-SCNC: 140 MMOL/L (ref 135–147)
SP GR UR STRIP.AUTO: 1.02 (ref 1–1.03)
TRIGL SERPL-MCNC: 104 MG/DL
UROBILINOGEN UR STRIP-ACNC: <2 MG/DL
WBC # BLD AUTO: 6.4 THOUSAND/UL (ref 4.31–10.16)
WBC #/AREA URNS AUTO: ABNORMAL /HPF

## 2023-07-27 PROCEDURE — 80061 LIPID PANEL: CPT

## 2023-07-27 PROCEDURE — 36415 COLL VENOUS BLD VENIPUNCTURE: CPT | Performed by: INTERNAL MEDICINE

## 2023-07-27 PROCEDURE — 80053 COMPREHEN METABOLIC PANEL: CPT | Performed by: INTERNAL MEDICINE

## 2023-07-27 PROCEDURE — 85025 COMPLETE CBC W/AUTO DIFF WBC: CPT | Performed by: INTERNAL MEDICINE

## 2023-07-27 PROCEDURE — 83036 HEMOGLOBIN GLYCOSYLATED A1C: CPT | Performed by: INTERNAL MEDICINE

## 2023-07-27 PROCEDURE — G0103 PSA SCREENING: HCPCS | Performed by: INTERNAL MEDICINE

## 2023-07-27 PROCEDURE — 81001 URINALYSIS AUTO W/SCOPE: CPT | Performed by: INTERNAL MEDICINE

## 2023-08-01 ENCOUNTER — OFFICE VISIT (OUTPATIENT)
Dept: INTERNAL MEDICINE CLINIC | Facility: CLINIC | Age: 58
End: 2023-08-01
Payer: COMMERCIAL

## 2023-08-01 VITALS
HEART RATE: 75 BPM | TEMPERATURE: 97.6 F | WEIGHT: 221.2 LBS | SYSTOLIC BLOOD PRESSURE: 136 MMHG | OXYGEN SATURATION: 98 % | HEIGHT: 73 IN | BODY MASS INDEX: 29.31 KG/M2 | DIASTOLIC BLOOD PRESSURE: 82 MMHG

## 2023-08-01 DIAGNOSIS — Z12.83 SCREENING FOR SKIN CANCER: ICD-10-CM

## 2023-08-01 DIAGNOSIS — F41.0 PANIC DISORDER: ICD-10-CM

## 2023-08-01 DIAGNOSIS — Z00.00 ANNUAL PHYSICAL EXAM: ICD-10-CM

## 2023-08-01 DIAGNOSIS — Z86.010 HISTORY OF COLON POLYPS: Primary | ICD-10-CM

## 2023-08-01 DIAGNOSIS — K21.9 GASTROESOPHAGEAL REFLUX DISEASE, UNSPECIFIED WHETHER ESOPHAGITIS PRESENT: ICD-10-CM

## 2023-08-01 DIAGNOSIS — F41.9 ANXIETY: ICD-10-CM

## 2023-08-01 PROBLEM — M17.31 POST-TRAUMATIC OSTEOARTHRITIS OF RIGHT KNEE: Status: RESOLVED | Noted: 2021-07-02 | Resolved: 2023-08-01

## 2023-08-01 PROBLEM — M25.462 EFFUSION OF LEFT KNEE: Status: RESOLVED | Noted: 2022-04-22 | Resolved: 2023-08-01

## 2023-08-01 PROBLEM — R05.9 COUGH: Status: ACTIVE | Noted: 2023-08-01

## 2023-08-01 PROCEDURE — 99396 PREV VISIT EST AGE 40-64: CPT | Performed by: INTERNAL MEDICINE

## 2023-08-01 RX ORDER — CITALOPRAM 20 MG/1
20 TABLET ORAL DAILY
Qty: 90 TABLET | Refills: 1 | Status: SHIPPED | OUTPATIENT
Start: 2023-08-01

## 2023-08-01 RX ORDER — FAMOTIDINE 20 MG/1
20 TABLET, FILM COATED ORAL
Qty: 30 TABLET | Refills: 0
Start: 2023-08-01 | End: 2023-08-31

## 2023-08-01 NOTE — ASSESSMENT & PLAN NOTE
Reports of nonproductive nocturnal cough. May be due to underlying GERD or allergies since patient had a cat. Denies any fever, chills. Plan: Will trial 20 mg famotidine daily at bedtime. Patient to follow-up in 1 month.

## 2023-08-01 NOTE — PROGRESS NOTES
[address] [facility] INTERNAL MEDICINE    NAME: Alexandra Corral  AGE: 60 y.o. SEX: male  : 1965     DATE: 2023     Assessment and Plan:     Problem List Items Addressed This Visit        Other    History of colon polyps - Primary     Previously seen by gastroenterology. Dx w/ dysplastic colon polyp at 28 yo, and has had regular screening since that time. Last colonoscopy was 2015 which was negative. Currently on a 5 year recall and is due. Plan:  Patient has agreed to return to his gastroenterologist to get a repeat colonoscopy since he is due. Anxiety     Takes 20 mg citalopram daily. Well-controlled. Plan:  Maintain current regimen. Sent for refill of citalopram.         Relevant Medications    citalopram (CeleXA) 20 mg tablet    Panic disorder    Relevant Medications    citalopram (CeleXA) 20 mg tablet    Annual physical exam     Routine follow-up. Denies any new complaints. Due for repeat colonoscopy. Has not had an annual skin exam.  Denies weight changes or concerning symptoms. Reviewed recent labs - elevated cholesterol and LDL. Patient is motivated to adjust diet. Plan:  Ambulatory referral to dermatology. Patient will follow-up with gastroenterology for colonoscopy. Encouraged lifestyle and dietary modifications to improve lipid panel. Other Visit Diagnoses     Screening for skin cancer        Relevant Orders    Ambulatory Referral to Dermatology    Gastroesophageal reflux disease, unspecified whether esophagitis present        Relevant Medications    famotidine (PEPCID) 20 mg tablet          Immunizations and preventive care screenings were discussed with patient today. Appropriate education was printed on patient's after visit summary. Discussed risks and benefits of prostate cancer screening.  We discussed the controversial history of PSA screening for prostate cancer in the [facility] as well as the risk of over detection and over treatment of prostate cancer by way of PSA screening. The patient understands that PSA blood testing is an imperfect way to screen for prostate cancer and that elevated PSA levels in the blood may also be caused by infection, inflammation, prostatic trauma or manipulation, urological procedures, or by benign prostatic enlargement. The role of the digital rectal examination in prostate cancer screening was also discussed and I discussed with him that there is large interobserver variability in the findings of digital rectal examination. Counseling:  Alcohol/drug use: discussed moderation in alcohol intake, the recommendations for healthy alcohol use, and avoidance of illicit drug use. Dental Health: discussed importance of regular tooth brushing, flossing, and dental visits. · Exercise: the importance of regular exercise/physical activity was discussed. Recommend exercise 3-5 times per week for at least 30 minutes. No follow-ups on file. Chief Complaint:     Chief Complaint   Patient presents with   • Annual Exam      History of Present Illness:     Adult Annual Physical   Patient here for a comprehensive physical exam. The patient reports no problems. PMHx includes anxiety and history of colonic polyps. Since his last visit, he denies any significant complaints. However, his wife noticed that he is coughing at night. This is not bothersome to the patient, but he would like to get it evaluated. We discussed the possibility of it being related to underlying GERD versus allergic reaction to his cat which recently passed away. He reports that his left knee has not been problematic ever since seeing orthopedic surgery last year. His labs were reviewed and discussed. At this time, he agrees that he does not need any statin medication as he is motivated to change his diet and be more physically active.   Additionally, he has agreed to see dermatology for an annual skin exam.    Of note, he is due for repeat colonoscopy since he had a history of colonic polyps. His last colonoscopy was in 2015 and is currently due for repeat. He has agreed to follow-up with gastroenterology for another colonoscopy this year. He follows routinely with a dentist.      Diet and Physical Activity  · Diet/Nutrition: well balanced diet. · Exercise: walking and tries to get 11k steps daily, but ensures he gets more steps on the weekends. Depression Screening  PHQ-2/9 Depression Screening    Little interest or pleasure in doing things: 0 - not at all  Feeling down, depressed, or hopeless: 0 - not at all  PHQ-2 Score: 0  PHQ-2 Interpretation: Negative depression screen       General Health  · Sleep: sleeps well and gets 7-8 hours of sleep on average. · Hearing: normal - bilateral.  · Vision: no vision problems. · Dental: regular dental visits.  Health  · Symptoms include: none     Review of Systems:     Review of Systems   Constitutional: Negative for chills, fever and unexpected weight change. Eyes: Negative for visual disturbance. Respiratory: Negative for cough, chest tightness and shortness of breath. Cardiovascular: Negative for chest pain, palpitations and leg swelling. Gastrointestinal: Negative for abdominal pain, diarrhea, nausea and vomiting. Skin: Negative for rash. Neurological: Negative for dizziness, light-headedness and headaches. All other systems reviewed and are negative. Past Medical History:     Past Medical History:   Diagnosis Date   • Anxiety 10/16/2012   • History of colon polyps       Past Surgical History:     Past Surgical History:   Procedure Laterality Date   • COLONOSCOPY  2015   • HAND SURGERY     • POLYPECTOMY     • HI COLONOSCOPY FLX DX W/COLLJ SPEC WHEN PFRMD N/A 6/11/2018    Procedure: COLONOSCOPY;  Surgeon: Briseyda Adorno MD;  Location: [facility];   Service: Colorectal      Family History:     History reviewed. No pertinent family history. Social History:     Social History     Socioeconomic History   • Marital status: /Civil Union     Spouse name: None   • Number of children: None   • Years of education: None   • Highest education level: None   Occupational History   • None   Tobacco Use   • Smoking status: Never   • Smokeless tobacco: Never   Vaping Use   • Vaping Use: Never used   Substance and Sexual Activity   • Alcohol use: Yes     Comment: socially   • Drug use: No   • Sexual activity: None   Other Topics Concern   • None   Social History Narrative   • None     Social Determinants of Health     Financial Resource Strain: Not on file   Food Insecurity: Not on file   Transportation Needs: Not on file   Physical Activity: Not on file   Stress: Not on file   Social Connections: Not on file   Intimate Partner Violence: Not on file   Housing Stability: Not on file      Current Medications:     Current Outpatient Medications   Medication Sig Dispense Refill   • busPIRone (BUSPAR) 5 mg tablet One tablet 3 times per day as needed for anxiety 60 tablet 5   • citalopram (CeleXA) 20 mg tablet Take 1 tablet (20 mg total) by mouth daily 90 tablet 1   • famotidine (PEPCID) 20 mg tablet Take 1 tablet (20 mg total) by mouth daily at bedtime 30 tablet 0   • Multiple Vitamins-Minerals (Centrum Silver 50+Men) TABS Take by mouth     • Omega-3 Fatty Acids (FISH OIL PO) Take by mouth     • VITAMIN D PO Take by mouth       No current facility-administered medications for this visit. Allergies: Allergies   Allergen Reactions   • Penicillins Hives   • Procaine GI Intolerance      Physical Exam:     /82   Pulse 75   Temp 97.6 °F (36.4 °C) (Tympanic)   Ht 6' 1" (1.854 m)   Wt 100 kg (221 lb 3.2 oz)   SpO2 98%   BMI 29.18 kg/m²     Physical Exam  Vitals reviewed. Constitutional:       General: He is not in acute distress. Appearance: Normal appearance. He is well-developed. He is not ill-appearing. HENT:      Head: Normocephalic and atraumatic. Eyes:      Conjunctiva/sclera: Conjunctivae normal.   Cardiovascular:      Rate and Rhythm: Normal rate and regular rhythm. Pulses: Normal pulses. Heart sounds: Normal heart sounds. Pulmonary:      Effort: Pulmonary effort is normal. No respiratory distress. Breath sounds: Normal breath sounds. Abdominal:      General: Bowel sounds are normal. There is no distension. Palpations: Abdomen is soft. Tenderness: There is no abdominal tenderness. There is no guarding or rebound. Musculoskeletal:         General: No swelling. Right lower leg: No edema. Left lower leg: No edema. Skin:     General: Skin is warm and dry. Neurological:      Mental Status: He is alert. Psychiatric:         Mood and Affect: Mood normal.         Behavior: Behavior normal.         Thought Content:  Thought content normal.          Cary Kamara DO  [facility] INTERNAL MEDICINE

## 2023-08-01 NOTE — PATIENT INSTRUCTIONS
To help with your coughing at night, please try taking 20mg pepcid once a day before bedtime. Try this for 1 month and let us know how you're doing. Wellness Visit for Adults   AMBULATORY CARE:   A wellness visit  is when you see your healthcare provider to get screened for health problems. Your healthcare provider will also give you advice on how to stay healthy. Write down your questions so you remember to ask them. Ask your healthcare provider how often you should have a wellness visit. What happens at a wellness visit:  Your healthcare provider will ask about your health, and your family history of health problems. This includes high blood pressure, heart disease, and cancer. He or she will ask if you have symptoms that concern you, if you smoke, and about your mood. You may also be asked about your intake of medicines, supplements, food, and alcohol. Any of the following may be done:  • Your weight  will be checked. Your height may also be checked so your body mass index (BMI) can be calculated. Your BMI shows if you are at a healthy weight. • Your blood pressure  and heart rate will be checked. Your temperature may also be checked. • Blood and urine tests  may be done. Blood tests may be done to check your cholesterol levels. Abnormal cholesterol levels increase your risk for heart disease and stroke. You may also need a blood or urine test to check for diabetes if you are at increased risk. Urine tests may be done to look for signs of an infection or kidney disease. • A physical exam  includes checking your heartbeat and lungs with a stethoscope. Your healthcare provider may also check your skin to look for sun damage. • Screening tests  may be recommended. A screening test is done to check for diseases that may not cause symptoms. The screening tests you may need depend on your age, gender, family history, and lifestyle habits.  For example, colorectal screening may be recommended if you are 50 years old or older. Screening tests you need if you are a woman:   • A Pap smear  is used to screen for cervical cancer. Pap smears are usually done every 3 to 5 years depending on your age. You may need them more often if you have had abnormal Pap smear test results in the past. Ask your healthcare provider how often you should have a Pap smear. • A mammogram  is an x-ray of your breasts to screen for breast cancer. Experts recommend mammograms every 2 years starting at age 48 years. You may need a mammogram at age 52 years or younger if you have an increased risk for breast cancer. Talk to your healthcare provider about when you should start having mammograms and how often you need them. Vaccines you may need:   • Get an influenza vaccine  every year. The influenza vaccine protects you from the flu. Several types of viruses cause the flu. The viruses change over time, so new vaccines are made each year. • Get a tetanus-diphtheria (Td) booster vaccine  every 10 years. This vaccine protects you against tetanus and diphtheria. Tetanus is a severe infection that may cause painful muscle spasms and lockjaw. Diphtheria is a severe bacterial infection that causes a thick covering in the back of your mouth and throat. • Get a human papillomavirus (HPV) vaccine  if you are female and aged 23 to 32 or male 23 to 24 and never received it. This vaccine protects you from HPV infection. HPV is the most common infection spread by sexual contact. HPV may also cause vaginal, penile, and anal cancers. • Get a pneumococcal vaccine  if you are aged 72 years or older. The pneumococcal vaccine is an injection given to protect you from pneumococcal disease. Pneumococcal disease is an infection caused by pneumococcal bacteria. The infection may cause pneumonia, meningitis, or an ear infection. • Get a shingles vaccine  if you are 60 or older, even if you have had shingles before.  The shingles vaccine is an injection to protect you from the varicella-zoster virus. This is the same virus that causes chickenpox. Shingles is a painful rash that develops in people who had chickenpox or have been exposed to the virus. How to eat healthy:  My Plate is a model for planning healthy meals. It shows the types and amounts of foods that should go on your plate. Fruits and vegetables make up about half of your plate, and grains and protein make up the other half. A serving of dairy is included on the side of your plate. The amount of calories and serving sizes you need depends on your age, gender, weight, and height. Examples of healthy foods are listed below:  • Eat a variety of vegetables  such as dark green, red, and orange vegetables. You can also include canned vegetables low in sodium (salt) and frozen vegetables without added butter or sauces. • Eat a variety of fresh fruits , canned fruit in 100% juice, frozen fruit, and dried fruit. • Include whole grains. At least half of the grains you eat should be whole grains. Examples include whole-wheat bread, wheat pasta, brown rice, and whole-grain cereals such as oatmeal.    • Eat a variety of protein foods such as seafood (fish and shellfish), lean meat, and poultry without skin (turkey and chicken). Examples of lean meats include pork leg, shoulder, or tenderloin, and beef round, sirloin, tenderloin, and extra lean ground beef. Other protein foods include eggs and egg substitutes, beans, peas, soy products, nuts, and seeds. • Choose low-fat dairy products such as skim or 1% milk or low-fat yogurt, cheese, and cottage cheese. • Limit unhealthy fats  such as butter, hard margarine, and shortening. Exercise:  Exercise at least 30 minutes per day on most days of the week. Some examples of exercise include walking, biking, dancing, and swimming. You can also fit in more physical activity by taking the stairs instead of the elevator or parking farther away from stores. Include muscle strengthening activities 2 days each week. Regular exercise provides many health benefits. It helps you manage your weight, and decreases your risk for type 2 diabetes, heart disease, stroke, and high blood pressure. Exercise can also help improve your mood. Ask your healthcare provider about the best exercise plan for you. General health and safety guidelines:   • Do not smoke. Nicotine and other chemicals in cigarettes and cigars can cause lung damage. Ask your healthcare provider for information if you currently smoke and need help to quit. E-cigarettes or smokeless tobacco still contain nicotine. Talk to your healthcare provider before you use these products. • Limit alcohol. A drink of alcohol is 12 ounces of beer, 5 ounces of wine, or 1½ ounces of liquor. • Lose weight, if needed. Being overweight increases your risk of certain health conditions. These include heart disease, high blood pressure, type 2 diabetes, and certain types of cancer. • Protect your skin. Do not sunbathe or use tanning beds. Use sunscreen with a SPF 15 or higher. Apply sunscreen at least 15 minutes before you go outside. Reapply sunscreen every 2 hours. Wear protective clothing, hats, and sunglasses when you are outside. • Drive safely. Always wear your seatbelt. Make sure everyone in your car wears a seatbelt. A seatbelt can save your life if you are in an accident. Do not use your cell phone when you are driving. This could distract you and cause an accident. Pull over if you need to make a call or send a text message. • Practice safe sex. Use latex condoms if are sexually active and have more than one partner. Your healthcare provider may recommend screening tests for sexually transmitted infections (STIs). • Wear helmets, lifejackets, and protective gear. Always wear a helmet when you ride a bike or motorcycle, go skiing, or play sports that could cause a head injury.  Wear protective equipment when you play sports. Wear a lifejacket when you are on a boat or doing water sports. © Copyright [employer] 2022 Information is for End User's use only and may not be sold, redistributed or otherwise used for commercial purposes. The above information is an  only. It is not intended as medical advice for individual conditions or treatments. Talk to your doctor, nurse or pharmacist before following any medical regimen to see if it is safe and effective for you.

## 2023-08-01 NOTE — ASSESSMENT & PLAN NOTE
Takes 20 mg citalopram daily. Well-controlled. Plan:  Maintain current regimen.   Sent for refill of citalopram.

## 2023-08-01 NOTE — ASSESSMENT & PLAN NOTE
Routine follow-up. Denies any new complaints. Due for repeat colonoscopy. Has not had an annual skin exam.  Denies weight changes or concerning symptoms. Reviewed recent labs - elevated cholesterol and LDL. Patient is motivated to adjust diet. Plan:  Ambulatory referral to dermatology. Patient will follow-up with gastroenterology for colonoscopy. Encouraged lifestyle and dietary modifications to improve lipid panel.

## 2023-08-01 NOTE — ASSESSMENT & PLAN NOTE
Previously seen by gastroenterology. Dx w/ dysplastic colon polyp at 28 yo, and has had regular screening since that time. Last colonoscopy was April 2015 which was negative. Currently on a 5 year recall and is due. Plan:  Patient has agreed to return to his gastroenterologist to get a repeat colonoscopy since he is due.

## 2023-09-30 PROBLEM — R05.9 COUGH: Status: RESOLVED | Noted: 2023-08-01 | Resolved: 2023-09-30

## 2023-11-02 DIAGNOSIS — F41.9 ANXIETY: ICD-10-CM

## 2023-11-02 DIAGNOSIS — F41.0 PANIC DISORDER: ICD-10-CM

## 2023-11-03 RX ORDER — BUSPIRONE HYDROCHLORIDE 5 MG/1
TABLET ORAL
Qty: 90 TABLET | Refills: 3 | Status: SHIPPED | OUTPATIENT
Start: 2023-11-03

## 2023-11-26 DIAGNOSIS — F41.0 PANIC DISORDER: ICD-10-CM

## 2023-11-26 DIAGNOSIS — F41.9 ANXIETY: ICD-10-CM

## 2023-11-27 RX ORDER — BUSPIRONE HYDROCHLORIDE 5 MG/1
TABLET ORAL
Qty: 270 TABLET | Refills: 2 | Status: SHIPPED | OUTPATIENT
Start: 2023-11-27

## 2024-02-14 DIAGNOSIS — F41.0 PANIC DISORDER: ICD-10-CM

## 2024-02-14 DIAGNOSIS — F41.9 ANXIETY: ICD-10-CM

## 2024-02-14 RX ORDER — CITALOPRAM 20 MG/1
20 TABLET ORAL DAILY
Qty: 90 TABLET | Refills: 1 | Status: SHIPPED | OUTPATIENT
Start: 2024-02-14

## 2024-06-08 ENCOUNTER — OFFICE VISIT (OUTPATIENT)
Dept: URGENT CARE | Facility: MEDICAL CENTER | Age: 59
End: 2024-06-08
Payer: COMMERCIAL

## 2024-06-08 VITALS
TEMPERATURE: 98.7 F | DIASTOLIC BLOOD PRESSURE: 66 MMHG | RESPIRATION RATE: 18 BRPM | SYSTOLIC BLOOD PRESSURE: 143 MMHG | HEART RATE: 66 BPM | OXYGEN SATURATION: 97 %

## 2024-06-08 DIAGNOSIS — S39.012A STRAIN OF MUSCLE, FASCIA AND TENDON OF LOWER BACK, INITIAL ENCOUNTER: Primary | ICD-10-CM

## 2024-06-08 PROCEDURE — S9083 URGENT CARE CENTER GLOBAL: HCPCS

## 2024-06-08 PROCEDURE — G0382 LEV 3 HOSP TYPE B ED VISIT: HCPCS

## 2024-06-08 RX ORDER — METHOCARBAMOL 750 MG/1
750 TABLET, FILM COATED ORAL EVERY 6 HOURS PRN
Qty: 30 TABLET | Refills: 0 | Status: SHIPPED | OUTPATIENT
Start: 2024-06-08

## 2024-06-08 RX ORDER — NAPROXEN 500 MG/1
500 TABLET ORAL 2 TIMES DAILY WITH MEALS
Qty: 30 TABLET | Refills: 0 | Status: SHIPPED | OUTPATIENT
Start: 2024-06-08

## 2024-06-08 NOTE — PROGRESS NOTES
"  [facility]        NAME: Art Lee is a 59 y.o. male  : 1965    MRN: [id number]  DATE: 2024  TIME: 9:04 AM    Assessment and Plan   Strain of muscle, fascia and tendon of lower back, initial encounter [S39.012A]  1. Strain of muscle, fascia and tendon of lower back, initial encounter  methocarbamol (Robaxin-750) 750 mg tablet    naproxen (Naprosyn) 500 mg tablet        Presentation consistent with low back strain with muscle spasms. Prescribed course of Robaxin, Naproxen. Discussed side effects. Advised symptomatic treatment, PCP follow up.    Patient Instructions     Prescribed course of Robaxin. Caution - may cause drowsiness.  Prescribed course of Naproxen, take as directed. Avoid use of other NSAIDs while taking this medication, may utilize over the counter Tylenol.  Recommend rest, ice for first 24 hours then heat, gentle stretching, massage.    Follow up with PCP in 3-5 days.  Proceed to  ER if symptoms worsen.    If tests are performed, our office will contact you with results only if changes need to made to the care plan discussed with you at the visit. You can review your full results on [facility].    Chief Complaint     Chief Complaint   Patient presents with    Back Pain     Patient c/o lower back pain with spasms after carrying a TV last night.           History of Present Illness       Back Pain  This is a new problem. Episode onset: over the past week some stiffness, yesterday while carrying TV and stepping down \"felt like my back went out\" The problem has been waxing and waning (spasms) since onset. The pain is present in the lumbar spine (central). The pain does not radiate. Pain scale: ranges from 8-10/10. Exacerbated by: movement, position. Associated symptoms include weakness (legs buckle when pain hits). Pertinent negatives include no bladder incontinence, bowel incontinence, chest pain, dysuria, fever, numbness, paresis, paresthesias, perianal numbness or " tingling. Treatments tried: 2 Aleve last PM, 2 Tylenol at 4 am with heating pad, 2 Aleve at 6-7 am. The treatment provided no relief.     PMH significant for sciatica of left side in 2021, went to PT. He also notes a history of Lyme disease.    Review of Systems   Review of Systems   Constitutional:  Negative for chills and fever.   Cardiovascular:  Negative for chest pain.   Gastrointestinal:  Negative for abdominal distention and bowel incontinence.   Genitourinary:  Negative for bladder incontinence, dysuria, frequency and urgency.   Musculoskeletal:  Positive for back pain.   Neurological:  Positive for weakness (legs buckle when pain hits). Negative for tingling, numbness and paresthesias.         Current Medications       Current Outpatient Medications:     methocarbamol (Robaxin-750) 750 mg tablet, Take 1 tablet (750 mg total) by mouth every 6 (six) hours as needed for muscle spasms, Disp: 30 tablet, Rfl: 0    naproxen (Naprosyn) 500 mg tablet, Take 1 tablet (500 mg total) by mouth 2 (two) times a day with meals, Disp: 30 tablet, Rfl: 0    busPIRone (BUSPAR) 5 mg tablet, ONE TABLET 3 TIMES PER DAY AS NEEDED FOR ANXIETY, Disp: 270 tablet, Rfl: 2    citalopram (CeleXA) 20 mg tablet, Take 1 tablet (20 mg total) by mouth daily, Disp: 90 tablet, Rfl: 1    famotidine (PEPCID) 20 mg tablet, Take 1 tablet (20 mg total) by mouth daily at bedtime, Disp: 30 tablet, Rfl: 0    Multiple Vitamins-Minerals (Centrum Silver 50+Men) TABS, Take by mouth, Disp: , Rfl:     Omega-3 Fatty Acids (FISH OIL PO), Take by mouth, Disp: , Rfl:     VITAMIN D PO, Take by mouth, Disp: , Rfl:     Current Allergies     Allergies as of 06/08/2024 - Reviewed 08/01/2023   Allergen Reaction Noted    Penicillins Hives 01/29/2014    Procaine GI Intolerance, Other (See Comments), and Nausea Only 01/29/2014            The following portions of the patient's history were reviewed and updated as appropriate: allergies, current medications, past family  history, past medical history, past social history, past surgical history and problem list.     Past Medical History:   Diagnosis Date    Anxiety 10/16/2012    History of colon polyps        Past Surgical History:   Procedure Laterality Date    COLONOSCOPY  2015    HAND SURGERY      POLYPECTOMY      SD COLONOSCOPY FLX DX W/COLLJ SPEC WHEN PFRMD N/A 6/11/2018    Procedure: COLONOSCOPY;  Surgeon: Farhat Chester MD;  Location: [facility];  Service: Colorectal       No family history on file.      Medications have been verified.        Objective   /66   Pulse 66   Temp 98.7 °F (37.1 °C)   Resp 18   SpO2 97%        Physical Exam     Physical Exam  Vitals and nursing note reviewed.   Constitutional:       General: He is not in acute distress.     Appearance: Normal appearance. He is not ill-appearing.      Comments: Patient in visible discomfort, standing and moving around, unable to get comfortable.   Cardiovascular:      Rate and Rhythm: Normal rate and regular rhythm.      Pulses: Normal pulses.      Heart sounds: Normal heart sounds.   Pulmonary:      Effort: Pulmonary effort is normal.      Breath sounds: Normal breath sounds.   Musculoskeletal:      Lumbar back: Spasms present. No swelling, deformity, signs of trauma, tenderness or bony tenderness. Decreased range of motion.   Neurological:      Mental Status: He is alert.

## 2024-06-08 NOTE — PATIENT INSTRUCTIONS
Prescribed course of Robaxin. Caution - may cause drowsiness.  Prescribed course of Naproxen, take as directed. Avoid use of other NSAIDs while taking this medication, may utilize over the counter Tylenol.  Recommend rest, ice for first 24 hours then heat, gentle stretching, massage.    Follow up with PCP in 3-5 days.  Proceed to  ER if symptoms worsen.    If tests are performed, our office will contact you with results only if changes need to made to the care plan discussed with you at the visit. You can review your full [facility] on [facility].    Acute Low Back Pain   AMBULATORY CARE:   Acute low back pain  is sudden discomfort that lasts up to 6 weeks and makes activity difficult.  Common symptoms include the following:   Back stiffness or spasms    Pain down the back or side of one leg    Holding yourself in an unusual position or posture to decrease your back pain    Not being able to find a sitting position that is comfortable    Slow increase in your pain for 24 to 48 hours after you stress your back    Tenderness on your lower back or severe pain when you move your back    Seek care immediately if:   You have severe pain.    You have sudden stiffness and heaviness on both buttocks down to both legs.    You have numbness or weakness in one leg, or pain in both legs.    You have numbness in your genital area or across your lower back.    You cannot control your urine or bowel movements.    Call your doctor if:   You have a fever.    You have pain at night or when you rest.    Your pain does not get better with treatment.    You have pain that worsens when you cough or sneeze.    You suddenly feel something pop or snap in your back.    You have questions or concerns about your condition or care.    The goal of treatment for acute low back pain  is to relieve your pain and help you be able to do your regular activities. Most people with acute lower back pain get better within 4 to 6 weeks. You may need  any of the following:  NSAIDs , such as ibuprofen, help decrease swelling, pain, and fever. This medicine is available with or without a doctor's order. NSAIDs can cause stomach bleeding or kidney problems in certain people. If you take blood thinner medicine, always ask your healthcare provider if NSAIDs are safe for you. Always read the medicine label and follow directions.    Acetaminophen  decreases pain and fever. It is available without a doctor's order. Ask how much to take and how often to take it. Follow directions. Read the labels of all other medicines you are using to see if they also contain acetaminophen, or ask your doctor or pharmacist. Acetaminophen can cause liver damage if not taken correctly.    Muscle relaxers  decrease pain by relaxing the muscles in your lower spine.    Prescription pain medicine  may be given. Ask your healthcare provider how to take this medicine safely. Some prescription pain medicines contain acetaminophen. Do not take other medicines that contain acetaminophen without talking to your healthcare provider. Too much acetaminophen may cause liver damage. Prescription pain medicine may cause constipation. Ask your healthcare provider how to prevent or treat constipation.    Manage your symptoms:   Stay active  as much as you can without causing more pain. Bed rest could make your back pain worse. Start with some light exercises such as walking. Avoid heavy lifting until your pain is gone. Ask for more information about the activities or exercises that are right for you.         Apply heat  on your back for 20 to 30 minutes every 2 hours for as many days as directed. Heat helps decrease pain and muscle spasms. Alternate heat and ice.    Apply ice  on your back for 15 to 20 minutes every hour or as directed. Use an ice pack, or put crushed ice in a plastic bag. Cover it with a towel before you apply it to your skin. Ice helps prevent tissue damage and decreases swelling and  pain.    Prevent acute low back pain:   Use proper body mechanics.      Bend at the hips and knees when you  objects. Do not bend from the waist. Use your leg muscles as you lift the load. Do not use your back. Keep the object close to your chest as you lift it. Try not to twist or lift anything above your waist.         Change your position often when you stand for long periods of time. Rest one foot on a small box or footrest, and then switch to the other foot often.    Try not to sit for long periods of time. When you do, sit in a straight-backed chair with your feet flat on the floor. Never reach, pull, or push while you are sitting.    Do exercises that strengthen your back muscles.  Warm up before you exercise. Ask your healthcare provider the best exercises for you.         Maintain a healthy weight.  Ask your healthcare provider what a healthy weight is for you. Ask him or her to help you create a weight loss plan if you are overweight.    Follow up with your doctor as directed:  Return for a follow-up visit if you still have pain after 1 to 3 weeks of treatment. You may need to visit an orthopedist if your back pain lasts longer than 12 weeks. Write down your questions so you remember to ask them during your visits.  © Copyright [employer] 2023 Information is for End User's use only and may not be sold, redistributed or otherwise used for commercial purposes.  The above information is an  only. It is not intended as medical advice for individual conditions or treatments. Talk to your doctor, nurse or pharmacist before following any medical regimen to see if it is safe and effective for you.

## 2024-06-20 DIAGNOSIS — S39.012A STRAIN OF MUSCLE, FASCIA AND TENDON OF LOWER BACK, INITIAL ENCOUNTER: ICD-10-CM

## 2024-06-20 RX ORDER — METHOCARBAMOL 750 MG/1
750 TABLET, FILM COATED ORAL EVERY 6 HOURS PRN
Qty: 30 TABLET | Refills: 0 | Status: SHIPPED | OUTPATIENT
Start: 2024-06-20

## 2024-08-01 ENCOUNTER — TELEPHONE (OUTPATIENT)
Age: 59
End: 2024-08-01

## 2024-08-01 DIAGNOSIS — Z12.5 SCREENING FOR PROSTATE CANCER: ICD-10-CM

## 2024-08-01 DIAGNOSIS — Z13.1 SCREENING FOR DIABETES MELLITUS: ICD-10-CM

## 2024-08-01 DIAGNOSIS — F41.0 PANIC DISORDER: ICD-10-CM

## 2024-08-01 DIAGNOSIS — F41.9 ANXIETY: ICD-10-CM

## 2024-08-01 DIAGNOSIS — Z13.0 SCREENING FOR DEFICIENCY ANEMIA: Primary | ICD-10-CM

## 2024-08-01 DIAGNOSIS — Z13.220 SCREENING FOR HYPERLIPIDEMIA: ICD-10-CM

## 2024-08-01 PROBLEM — Z00.00 ANNUAL PHYSICAL EXAM: Status: RESOLVED | Noted: 2023-08-01 | Resolved: 2024-08-01

## 2024-08-01 RX ORDER — CITALOPRAM 20 MG/1
20 TABLET ORAL DAILY
Qty: 90 TABLET | Refills: 1 | Status: SHIPPED | OUTPATIENT
Start: 2024-08-01

## 2024-08-01 NOTE — TELEPHONE ENCOUNTER
Patient call to get his lab order in for the visit the patient has with doctor Teri tomorrow for a physical. Please add the lab order for the patient and contact the patient when the lab order is in the system. Thank you

## 2024-08-02 ENCOUNTER — TELEPHONE (OUTPATIENT)
Age: 59
End: 2024-08-02

## 2024-08-02 ENCOUNTER — OFFICE VISIT (OUTPATIENT)
Dept: INTERNAL MEDICINE CLINIC | Facility: CLINIC | Age: 59
End: 2024-08-02
Payer: COMMERCIAL

## 2024-08-02 VITALS
DIASTOLIC BLOOD PRESSURE: 74 MMHG | WEIGHT: 218 LBS | HEART RATE: 71 BPM | SYSTOLIC BLOOD PRESSURE: 120 MMHG | HEIGHT: 73 IN | TEMPERATURE: 97.8 F | OXYGEN SATURATION: 97 % | BODY MASS INDEX: 28.89 KG/M2

## 2024-08-02 DIAGNOSIS — Z71.89 ENCOUNTER FOR FAMILY COUNSELING: ICD-10-CM

## 2024-08-02 DIAGNOSIS — F41.9 ANXIETY: ICD-10-CM

## 2024-08-02 DIAGNOSIS — K21.9 GERD (GASTROESOPHAGEAL REFLUX DISEASE): ICD-10-CM

## 2024-08-02 DIAGNOSIS — Z00.00 ANNUAL PHYSICAL EXAM: Primary | ICD-10-CM

## 2024-08-02 PROBLEM — F41.0 PANIC DISORDER: Status: RESOLVED | Noted: 2021-08-04 | Resolved: 2024-08-02

## 2024-08-02 PROCEDURE — 99396 PREV VISIT EST AGE 40-64: CPT | Performed by: INTERNAL MEDICINE

## 2024-08-02 NOTE — TELEPHONE ENCOUNTER
Patient called in to let us know he is on the way. Stuck in traffic on [place]. Should be there by 8:05am.     Got through to Lor and let her know.

## 2024-08-02 NOTE — PROGRESS NOTES
INTERNAL MEDICINE OFFICE VISIT       NAME: Art Lee  AGE: 59 y.o. SEX: male       : 1965        MRN: [id number]    DATE: 2024  TIME: 8:46 AM    Assessment and Plan   1. Annual physical exam    Plan   Follow-up in 1 years time  Follow up labs     2. Anxiety    History of anxiety.   Home medications of citalopram and buspirone.   Plan  -Continue home medications  - Ambulatory referral to Psych Services; Future    3. Encounter for family counseling    This visit patient requested a behavioral health referral.  For lifestyle counseling regarding a family matter.     Plan  -See Plan for Anxiety above.     4. GERD (gastroesophageal reflux disease)    Patient had history of GERD.  Previously patient would intermittently dry cough during the day and especially at night.  He would go to sleep and wake up with heartburn.   Has been taking Prilosec as well as Pepcid.    Since retiring at the end of  patient is leaving a more active lifestyle and eats well-balanced meals.  Patient eats dinner earlier in the evening.  Since making lifestyle changes the patient has not had heartburn or intermittent dry coughing.   Plan  Continue Prilosec.  Take Pepcid 20 mg twice a day for 3 weeks then stop.  Monitor symptoms    Chief Complaint   Annual Physical exam       History of Present Illness   Art Lee is a 59 y.o.-year-old male with previous history of lyme's arthritis and GERD who presented today for annual follow up.     Patient reports that overall he is doing well.  Patient did have an emergency department visit on 2024.  Patient was carrying large TV accidentally use stepped and turned the wrong way immediately experienced back spasm.  Rated pain as a 6 out of 10.  Responded to naproxen and Robaxin.  Felt better in a few weeks.  No limitation in range of motion.      Of note patient has not experienced any arthritic pain secondary to Lyme's disease in 1 year.     At the end of 2024, patient  retired.  Patient is leading a healthier and active lifestyle.  He walks 11,000 steps or 6 miles a day.  He goes to the gym a few times a week.  He has been cooking more home-cooked meals.  Has lost 15 pounds since retiring.  Is making these lifestyle changes patient has noticed that he has less heartburn and intermittent coughing.  He no longer experiences coughing at night or heartburn in the morning.  Patient limit spicy and heavy meals.  Patient finishes dinner earlier in the evening.  Prophylactically patient is still taking Prilosec.  Discussed with patient about weaning off of Prilosec.  Made a plan to just discontinue Prilosec and take Pepcid 20 mg twice a day for 3 weeks then stop. Patient is agreeable. Encouraged patient to continue this active llifestyle. This will help decrease cholesterol levels.  He is agreeable to management plans and will get annual lab work done in a few days.     This visit patient requested a behavioral health referral.  For lifestyle counseling regarding a family matter.  Patient is agreeable to see a counselor at [facility] within a year's time.    Of note patient will get annual eye exam done from November, he  will follow-up for his annual dental exam, and will seek out dermatologic care this year.      Review of Systems   Review of Systems   Constitutional:  Positive for activity change. Negative for appetite change, chills, diaphoresis and fatigue.   HENT:  Negative for congestion, sinus pain and sore throat.    Respiratory:  Negative for shortness of breath.    Cardiovascular:  Negative for chest pain and palpitations.   Gastrointestinal:  Negative for abdominal distention, abdominal pain and constipation.   Endocrine: Negative for polyuria.   Genitourinary:  Negative for decreased urine volume, dysuria, flank pain, hematuria and urgency.   Musculoskeletal:  Negative for arthralgias and gait problem.   Neurological:  Negative for dizziness, weakness and light-headedness.    Psychiatric/Behavioral: Negative.         Active Problem List     Patient Active Problem List   Diagnosis    Anxiety    GERD (gastroesophageal reflux disease)       The following portions of the patient's history were reviewed and updated as appropriate: allergies, current medications, past family history, past medical history, past social history, past surgical history, and problem list.    Objective     Vitals:    08/02/24 0809   BP: 120/74   Pulse: 71   Temp: 97.8 °F (36.6 °C)   SpO2: 97%     Wt Readings from Last 3 Encounters:   08/02/24 98.9 kg (218 lb)   08/01/23 100 kg (221 lb 3.2 oz)   06/13/22 97.1 kg (214 lb)       Physical Exam  Constitutional:       Appearance: Normal appearance. He is normal weight.   HENT:      Head: Normocephalic and atraumatic.      Right Ear: Tympanic membrane normal.      Left Ear: Tympanic membrane normal.      Nose: Nose normal.   Eyes:      Extraocular Movements: Extraocular movements intact.      Conjunctiva/sclera: Conjunctivae normal.      Pupils: Pupils are equal, round, and reactive to light.   Cardiovascular:      Rate and Rhythm: Normal rate and regular rhythm.      Pulses: Normal pulses.      Heart sounds: Normal heart sounds.   Pulmonary:      Effort: Pulmonary effort is normal.      Breath sounds: Normal breath sounds.   Abdominal:      General: Abdomen is flat. Bowel sounds are normal.      Palpations: Abdomen is soft.   Musculoskeletal:         General: Normal range of motion.      Cervical back: Normal range of motion.   Skin:     Capillary Refill: Capillary refill takes less than 2 seconds.   Neurological:      General: No focal deficit present.      Mental Status: He is alert and oriented to person, place, and time. Mental status is at baseline.   Psychiatric:         Mood and Affect: Mood normal.         Behavior: Behavior normal.         Thought Content: Thought content normal.         Judgment: Judgment normal.         Pertinent Laboratory/Diagnostic  Studies:        Orders Placed This Encounter   Procedures    Ambulatory referral to Psych Services       ALLERGIES:  Allergies   Allergen Reactions    Penicillins Hives    Procaine GI Intolerance, Other (See Comments) and Nausea Only       Current Medications     Current Outpatient Medications   Medication Sig Dispense Refill    busPIRone (BUSPAR) 5 mg tablet ONE TABLET 3 TIMES PER DAY AS NEEDED FOR ANXIETY 270 tablet 2    citalopram (CeleXA) 20 mg tablet TAKE 1 TABLET BY MOUTH EVERY DAY 90 tablet 1    Multiple Vitamins-Minerals (Centrum Silver 50+Men) TABS Take by mouth      Omega-3 Fatty Acids (FISH OIL PO) Take by mouth      famotidine (PEPCID) 20 mg tablet Take 1 tablet (20 mg total) by mouth daily at bedtime 30 tablet 0     No current facility-administered medications for this visit.         Health Maintenance        Elena Robbins MD

## 2024-08-06 ENCOUNTER — TELEPHONE (OUTPATIENT)
Age: 59
End: 2024-08-06

## 2024-08-06 ENCOUNTER — APPOINTMENT (OUTPATIENT)
Dept: LAB | Facility: MEDICAL CENTER | Age: 59
End: 2024-08-06
Payer: COMMERCIAL

## 2024-08-06 LAB
ALBUMIN SERPL BCG-MCNC: 4.2 G/DL (ref 3.5–5)
ALP SERPL-CCNC: 50 U/L (ref 34–104)
ALT SERPL W P-5'-P-CCNC: 35 U/L (ref 7–52)
ANION GAP SERPL CALCULATED.3IONS-SCNC: 8 MMOL/L (ref 4–13)
AST SERPL W P-5'-P-CCNC: 40 U/L (ref 13–39)
BASOPHILS # BLD AUTO: 0.04 THOUSANDS/ÂΜL (ref 0–0.1)
BASOPHILS NFR BLD AUTO: 1 % (ref 0–1)
BILIRUB SERPL-MCNC: 0.79 MG/DL (ref 0.2–1)
BUN SERPL-MCNC: 22 MG/DL (ref 5–25)
CALCIUM SERPL-MCNC: 9 MG/DL (ref 8.4–10.2)
CHLORIDE SERPL-SCNC: 101 MMOL/L (ref 96–108)
CO2 SERPL-SCNC: 29 MMOL/L (ref 21–32)
CREAT SERPL-MCNC: 0.82 MG/DL (ref 0.6–1.3)
EOSINOPHIL # BLD AUTO: 0.29 THOUSAND/ÂΜL (ref 0–0.61)
EOSINOPHIL NFR BLD AUTO: 4 % (ref 0–6)
ERYTHROCYTE [DISTWIDTH] IN BLOOD BY AUTOMATED COUNT: 13.3 % (ref 11.6–15.1)
EST. AVERAGE GLUCOSE BLD GHB EST-MCNC: 103 MG/DL
GFR SERPL CREATININE-BSD FRML MDRD: 96 ML/MIN/1.73SQ M
GLUCOSE P FAST SERPL-MCNC: 78 MG/DL (ref 65–99)
HBA1C MFR BLD: 5.2 %
HCT VFR BLD AUTO: 45.1 % (ref 36.5–49.3)
HGB BLD-MCNC: 14.7 G/DL (ref 12–17)
IMM GRANULOCYTES # BLD AUTO: 0.04 THOUSAND/UL (ref 0–0.2)
IMM GRANULOCYTES NFR BLD AUTO: 1 % (ref 0–2)
LYMPHOCYTES # BLD AUTO: 1.26 THOUSANDS/ÂΜL (ref 0.6–4.47)
LYMPHOCYTES NFR BLD AUTO: 19 % (ref 14–44)
MCH RBC QN AUTO: 29.8 PG (ref 26.8–34.3)
MCHC RBC AUTO-ENTMCNC: 32.6 G/DL (ref 31.4–37.4)
MCV RBC AUTO: 91 FL (ref 82–98)
MONOCYTES # BLD AUTO: 0.97 THOUSAND/ÂΜL (ref 0.17–1.22)
MONOCYTES NFR BLD AUTO: 15 % (ref 4–12)
NEUTROPHILS # BLD AUTO: 4.1 THOUSANDS/ÂΜL (ref 1.85–7.62)
NEUTS SEG NFR BLD AUTO: 60 % (ref 43–75)
NRBC BLD AUTO-RTO: 0 /100 WBCS
PLATELET # BLD AUTO: 235 THOUSANDS/UL (ref 149–390)
PMV BLD AUTO: 10.1 FL (ref 8.9–12.7)
POTASSIUM SERPL-SCNC: 4.7 MMOL/L (ref 3.5–5.3)
PROT SERPL-MCNC: 6.8 G/DL (ref 6.4–8.4)
PSA SERPL-MCNC: 2.26 NG/ML (ref 0–4)
RBC # BLD AUTO: 4.94 MILLION/UL (ref 3.88–5.62)
SODIUM SERPL-SCNC: 138 MMOL/L (ref 135–147)
WBC # BLD AUTO: 6.7 THOUSAND/UL (ref 4.31–10.16)

## 2024-08-06 PROCEDURE — 36415 COLL VENOUS BLD VENIPUNCTURE: CPT | Performed by: INTERNAL MEDICINE

## 2024-08-06 PROCEDURE — 83036 HEMOGLOBIN GLYCOSYLATED A1C: CPT | Performed by: INTERNAL MEDICINE

## 2024-08-06 PROCEDURE — 80053 COMPREHEN METABOLIC PANEL: CPT | Performed by: INTERNAL MEDICINE

## 2024-08-06 PROCEDURE — 85025 COMPLETE CBC W/AUTO DIFF WBC: CPT | Performed by: INTERNAL MEDICINE

## 2024-08-06 PROCEDURE — G0103 PSA SCREENING: HCPCS | Performed by: INTERNAL MEDICINE

## 2024-08-06 NOTE — TELEPHONE ENCOUNTER
Writer contacted pt regarding referral for [facility] to verify services needed. Writer explained to pt that there is no openilng available at this time. Pt agreed to be added to wait list. Referral closed.

## 2024-10-22 ENCOUNTER — TELEPHONE (OUTPATIENT)
Age: 59
End: 2024-10-22

## 2024-10-22 NOTE — TELEPHONE ENCOUNTER
Pt called to request last two (2) office visit notes to be Faxed to [facility]    8/2/2024-Physical   8/1/2023-Physical       Fax #:  [phone]

## 2025-02-01 DIAGNOSIS — F41.9 ANXIETY: ICD-10-CM

## 2025-02-01 DIAGNOSIS — F41.0 PANIC DISORDER: ICD-10-CM

## 2025-02-03 RX ORDER — CITALOPRAM HYDROBROMIDE 20 MG/1
20 TABLET ORAL DAILY
Qty: 90 TABLET | Refills: 1 | Status: SHIPPED | OUTPATIENT
Start: 2025-02-03

## 2025-07-30 DIAGNOSIS — F41.0 PANIC DISORDER: ICD-10-CM

## 2025-07-30 DIAGNOSIS — F41.9 ANXIETY: ICD-10-CM

## 2025-07-31 RX ORDER — CITALOPRAM HYDROBROMIDE 20 MG/1
20 TABLET ORAL DAILY
Qty: 30 TABLET | Refills: 0 | Status: SHIPPED | OUTPATIENT
Start: 2025-07-31

## 2025-08-04 ENCOUNTER — OFFICE VISIT (OUTPATIENT)
Age: 60
End: 2025-08-04
Payer: COMMERCIAL

## 2025-08-04 VITALS
BODY MASS INDEX: 29.29 KG/M2 | RESPIRATION RATE: 17 BRPM | TEMPERATURE: 96.7 F | WEIGHT: 221 LBS | OXYGEN SATURATION: 99 % | SYSTOLIC BLOOD PRESSURE: 122 MMHG | DIASTOLIC BLOOD PRESSURE: 62 MMHG | HEIGHT: 73 IN | HEART RATE: 66 BPM

## 2025-08-04 DIAGNOSIS — Z12.5 SCREENING FOR PROSTATE CANCER: ICD-10-CM

## 2025-08-04 DIAGNOSIS — Z13.0 SCREENING FOR DEFICIENCY ANEMIA: ICD-10-CM

## 2025-08-04 DIAGNOSIS — F41.9 ANXIETY: ICD-10-CM

## 2025-08-04 DIAGNOSIS — Z00.00 ANNUAL PHYSICAL EXAM: Primary | ICD-10-CM

## 2025-08-04 DIAGNOSIS — Z13.1 SCREENING FOR DIABETES MELLITUS: ICD-10-CM

## 2025-08-04 DIAGNOSIS — Z13.220 SCREENING FOR HYPERLIPIDEMIA: ICD-10-CM

## 2025-08-04 PROBLEM — K21.9 GERD (GASTROESOPHAGEAL REFLUX DISEASE): Status: RESOLVED | Noted: 2024-08-02 | Resolved: 2025-08-04

## 2025-08-04 PROCEDURE — 99396 PREV VISIT EST AGE 40-64: CPT | Performed by: INTERNAL MEDICINE

## 2025-08-04 PROCEDURE — 99213 OFFICE O/P EST LOW 20 MIN: CPT | Performed by: INTERNAL MEDICINE

## 2025-08-06 ENCOUNTER — APPOINTMENT (OUTPATIENT)
Dept: LAB | Facility: MEDICAL CENTER | Age: 60
End: 2025-08-06
Payer: COMMERCIAL

## 2025-08-06 LAB
ALBUMIN SERPL BCG-MCNC: 4.2 G/DL (ref 3.5–5)
ALP SERPL-CCNC: 51 U/L (ref 34–104)
ALT SERPL W P-5'-P-CCNC: 24 U/L (ref 7–52)
ANION GAP SERPL CALCULATED.3IONS-SCNC: 3 MMOL/L (ref 4–13)
AST SERPL W P-5'-P-CCNC: 28 U/L (ref 13–39)
BASOPHILS # BLD AUTO: 0.05 THOUSANDS/ÂΜL (ref 0–0.1)
BASOPHILS NFR BLD AUTO: 1 % (ref 0–1)
BILIRUB SERPL-MCNC: 0.73 MG/DL (ref 0.2–1)
BUN SERPL-MCNC: 14 MG/DL (ref 5–25)
CALCIUM SERPL-MCNC: 8.8 MG/DL (ref 8.4–10.2)
CHLORIDE SERPL-SCNC: 105 MMOL/L (ref 96–108)
CO2 SERPL-SCNC: 32 MMOL/L (ref 21–32)
CREAT SERPL-MCNC: 0.85 MG/DL (ref 0.6–1.3)
EOSINOPHIL # BLD AUTO: 0.17 THOUSAND/ÂΜL (ref 0–0.61)
EOSINOPHIL NFR BLD AUTO: 2 % (ref 0–6)
ERYTHROCYTE [DISTWIDTH] IN BLOOD BY AUTOMATED COUNT: 13.2 % (ref 11.6–15.1)
EST. AVERAGE GLUCOSE BLD GHB EST-MCNC: 114 MG/DL
GFR SERPL CREATININE-BSD FRML MDRD: 94 ML/MIN/1.73SQ M
GLUCOSE P FAST SERPL-MCNC: 91 MG/DL (ref 65–99)
HBA1C MFR BLD: 5.6 %
HCT VFR BLD AUTO: 42.5 % (ref 36.5–49.3)
HGB BLD-MCNC: 14.2 G/DL (ref 12–17)
IMM GRANULOCYTES # BLD AUTO: 0.03 THOUSAND/UL (ref 0–0.2)
IMM GRANULOCYTES NFR BLD AUTO: 0 % (ref 0–2)
LYMPHOCYTES # BLD AUTO: 1.42 THOUSANDS/ÂΜL (ref 0.6–4.47)
LYMPHOCYTES NFR BLD AUTO: 20 % (ref 14–44)
MCH RBC QN AUTO: 30 PG (ref 26.8–34.3)
MCHC RBC AUTO-ENTMCNC: 33.4 G/DL (ref 31.4–37.4)
MCV RBC AUTO: 90 FL (ref 82–98)
MONOCYTES # BLD AUTO: 0.86 THOUSAND/ÂΜL (ref 0.17–1.22)
MONOCYTES NFR BLD AUTO: 12 % (ref 4–12)
NEUTROPHILS # BLD AUTO: 4.67 THOUSANDS/ÂΜL (ref 1.85–7.62)
NEUTS SEG NFR BLD AUTO: 65 % (ref 43–75)
NRBC BLD AUTO-RTO: 0 /100 WBCS
PLATELET # BLD AUTO: 211 THOUSANDS/UL (ref 149–390)
PMV BLD AUTO: 9.6 FL (ref 8.9–12.7)
POTASSIUM SERPL-SCNC: 5 MMOL/L (ref 3.5–5.3)
PROT SERPL-MCNC: 6.5 G/DL (ref 6.4–8.4)
PSA SERPL-MCNC: 1.6 NG/ML (ref 0–4)
RBC # BLD AUTO: 4.74 MILLION/UL (ref 3.88–5.62)
SODIUM SERPL-SCNC: 140 MMOL/L (ref 135–147)
WBC # BLD AUTO: 7.2 THOUSAND/UL (ref 4.31–10.16)

## 2025-08-06 PROCEDURE — 80053 COMPREHEN METABOLIC PANEL: CPT | Performed by: INTERNAL MEDICINE

## 2025-08-06 PROCEDURE — 36415 COLL VENOUS BLD VENIPUNCTURE: CPT | Performed by: INTERNAL MEDICINE

## 2025-08-06 PROCEDURE — G0103 PSA SCREENING: HCPCS | Performed by: INTERNAL MEDICINE

## 2025-08-06 PROCEDURE — 85025 COMPLETE CBC W/AUTO DIFF WBC: CPT | Performed by: INTERNAL MEDICINE

## 2025-08-06 PROCEDURE — 83036 HEMOGLOBIN GLYCOSYLATED A1C: CPT | Performed by: INTERNAL MEDICINE
